# Patient Record
Sex: MALE | Race: WHITE | Employment: FULL TIME | URBAN - METROPOLITAN AREA
[De-identification: names, ages, dates, MRNs, and addresses within clinical notes are randomized per-mention and may not be internally consistent; named-entity substitution may affect disease eponyms.]

---

## 2020-01-18 ENCOUNTER — HOSPITAL ENCOUNTER (EMERGENCY)
Age: 20
Discharge: HOME | End: 2020-01-18
Attending: EMERGENCY MEDICINE
Payer: COMMERCIAL

## 2020-01-18 ENCOUNTER — APPOINTMENT (OUTPATIENT)
Dept: GENERAL RADIOLOGY | Age: 20
End: 2020-01-18
Payer: COMMERCIAL

## 2020-01-18 ENCOUNTER — APPOINTMENT (OUTPATIENT)
Dept: CT IMAGING | Age: 20
End: 2020-01-18
Payer: COMMERCIAL

## 2020-01-18 VITALS
HEART RATE: 88 BPM | SYSTOLIC BLOOD PRESSURE: 128 MMHG | DIASTOLIC BLOOD PRESSURE: 77 MMHG | TEMPERATURE: 98.7 F | WEIGHT: 200 LBS | RESPIRATION RATE: 18 BRPM | OXYGEN SATURATION: 99 %

## 2020-01-18 PROCEDURE — 96372 THER/PROPH/DIAG INJ SC/IM: CPT

## 2020-01-18 PROCEDURE — 71101 X-RAY EXAM UNILAT RIBS/CHEST: CPT

## 2020-01-18 PROCEDURE — 2580000003 HC RX 258: Performed by: EMERGENCY MEDICINE

## 2020-01-18 PROCEDURE — 6360000002 HC RX W HCPCS: Performed by: EMERGENCY MEDICINE

## 2020-01-18 PROCEDURE — 99284 EMERGENCY DEPT VISIT MOD MDM: CPT

## 2020-01-18 PROCEDURE — 6360000004 HC RX CONTRAST MEDICATION: Performed by: RADIOLOGY

## 2020-01-18 PROCEDURE — 74177 CT ABD & PELVIS W/CONTRAST: CPT

## 2020-01-18 RX ORDER — IBUPROFEN 800 MG/1
800 TABLET ORAL EVERY 8 HOURS PRN
Qty: 15 TABLET | Refills: 0 | Status: SHIPPED | OUTPATIENT
Start: 2020-01-18 | End: 2022-06-14

## 2020-01-18 RX ORDER — ALBUTEROL SULFATE 90 UG/1
2 AEROSOL, METERED RESPIRATORY (INHALATION) EVERY 6 HOURS PRN
Qty: 1 INHALER | Refills: 1 | Status: SHIPPED | OUTPATIENT
Start: 2020-01-18

## 2020-01-18 RX ORDER — BENZONATATE 100 MG/1
200 CAPSULE ORAL 3 TIMES DAILY PRN
Qty: 21 CAPSULE | Refills: 0 | Status: SHIPPED | OUTPATIENT
Start: 2020-01-18 | End: 2020-01-25

## 2020-01-18 RX ORDER — KETOROLAC TROMETHAMINE 30 MG/ML
30 INJECTION, SOLUTION INTRAMUSCULAR; INTRAVENOUS ONCE
Status: COMPLETED | OUTPATIENT
Start: 2020-01-18 | End: 2020-01-18

## 2020-01-18 RX ORDER — ARIPIPRAZOLE 10 MG/1
TABLET ORAL DAILY
COMMUNITY
End: 2022-09-03

## 2020-01-18 RX ORDER — 0.9 % SODIUM CHLORIDE 0.9 %
1000 INTRAVENOUS SOLUTION INTRAVENOUS ONCE
Status: COMPLETED | OUTPATIENT
Start: 2020-01-18 | End: 2020-01-18

## 2020-01-18 RX ADMIN — KETOROLAC TROMETHAMINE 30 MG: 30 INJECTION, SOLUTION INTRAMUSCULAR; INTRAVENOUS at 03:42

## 2020-01-18 RX ADMIN — SODIUM CHLORIDE 1000 ML: 9 INJECTION, SOLUTION INTRAVENOUS at 04:17

## 2020-01-18 RX ADMIN — IOPAMIDOL 100 ML: 755 INJECTION, SOLUTION INTRAVENOUS at 04:07

## 2020-01-18 ASSESSMENT — PAIN SCALES - GENERAL: PAINLEVEL_OUTOF10: 7

## 2020-01-18 NOTE — ED PROVIDER NOTES
pre-testing outweigh the risks or are not indicated as specified below and therefore the following is/are being waived:    [] Pregnancy test   [] Patients LMP on-time and regular.   [] Patient had Tubal Ligation or has other Contraception Device. [] Patient  is Menopausal or Premenarcheal.    [] Patient had Full or Partial Hysterectomy. [] Protocol for Iodine allergy    [] MRI Questionnaire     [x] BUN/Creatinine   [x] Patient age w/no hx of renal dysfunction. [] Patient on Dialysis. [] Recent Normal Labs. Electronically signed by Montse Rodriguez MD on 1/18/20 at 3:49 AM            --------------------------------- IMPRESSION AND DISPOSITION ---------------------------------    IMPRESSION  1. Rib contusion, left, initial encounter        DISPOSITION  Disposition: Discharge to home  Patient condition is stable      NOTE: This report was transcribed using voice recognition software.  Every effort was made to ensure accuracy; however, inadvertent computerized transcription errors may be present        Delmy Lindo MD  01/18/20 7145 Robert Green MD  01/25/20 [id number]

## 2021-02-10 ENCOUNTER — OFFICE VISIT (OUTPATIENT)
Dept: PRIMARY CARE CLINIC | Age: 21
End: 2021-02-10
Payer: COMMERCIAL

## 2021-02-10 VITALS
SYSTOLIC BLOOD PRESSURE: 121 MMHG | OXYGEN SATURATION: 97 % | WEIGHT: 180 LBS | HEIGHT: 69 IN | DIASTOLIC BLOOD PRESSURE: 79 MMHG | TEMPERATURE: 98.5 F | HEART RATE: 105 BPM | BODY MASS INDEX: 26.66 KG/M2

## 2021-02-10 DIAGNOSIS — J02.9 SORE THROAT: ICD-10-CM

## 2021-02-10 DIAGNOSIS — Z20.822 SUSPECTED COVID-19 VIRUS INFECTION: Primary | ICD-10-CM

## 2021-02-10 DIAGNOSIS — U07.1 COVID-19 VIRUS INFECTION: ICD-10-CM

## 2021-02-10 LAB
Lab: ABNORMAL
QC PASS/FAIL: ABNORMAL
S PYO AG THROAT QL: NORMAL
SARS-COV-2, POC: DETECTED

## 2021-02-10 PROCEDURE — 87426 SARSCOV CORONAVIRUS AG IA: CPT | Performed by: NURSE PRACTITIONER

## 2021-02-10 PROCEDURE — 99213 OFFICE O/P EST LOW 20 MIN: CPT | Performed by: NURSE PRACTITIONER

## 2021-02-10 PROCEDURE — 87880 STREP A ASSAY W/OPTIC: CPT | Performed by: NURSE PRACTITIONER

## 2021-02-10 RX ORDER — FLUOXETINE 10 MG/1
CAPSULE ORAL
COMMUNITY
Start: 2021-01-18

## 2021-02-10 NOTE — PROGRESS NOTES
Chief Complaint   Headache (Started two days ago), Cough, Pharyngitis, and Generalized Body Aches      History of Present Illness   Source of history provided by:  patientBhavna Nowak is a [address] y.o. old male who presents to the flu clinic with complaints of Fever, Chills, Generalized Body Aches, Prod Cough and Fatigue x 2 days. States symptoms have stayed the same since onset. Has been taking none without symptomatic relief. Denies any Shortness of breath. Denies any hx of no history of pneumonia or bronchitis. ROS   Pertinent positives and negatives are stated within HPI, all other systems reviewed and are negative. Past Medical History:  has a past medical history of Asthma. Past Surgical History:  has no past surgical history on file. Social History:  reports that he has never smoked. He does not have any smokeless tobacco history on file. He reports that he does not drink alcohol. Family History: family history is not on file. Allergies: Patient has no known allergies. Physical Exam   Vital Signs:  /79   Pulse 105   Temp 98.5 °F (36.9 °C)   Ht 5' 9\" (1.753 m)   Wt 180 lb (81.6 kg)   SpO2 97%   BMI 26.58 kg/m²    Oxygen Saturation Interpretation: Normal.    Constitutional:  Alert, development consistent with age. NAD. Head:  NC/NT. Airway patent. Ears: TMs Clear bilaterally. Canals without exudate or swelling bilaterally. Mouth: Posterior pharynx with mild erythema and clear postnasal drip. No tonsillar hypertrophy or exudate. Neck:  Normal ROM. Supple. no anterior cervical adenopathy noted. Lungs: CTAB without wheezes, rales, or rhonchi. CV:  Regular rate and rhythm, normal heart sounds, without pathological murmurs, ectopy, gallops, or rubs. Skin:  Normal turgor. Warm, dry, without visible rash. Lymphatic: No lymphangitis or adenopathy noted. Neurological:  Oriented. Motor functions intact.     Lab / Imaging Results (All laboratory and radiology results have been personally reviewed by myself)  Labs:  Results for orders placed or performed in visit on 02/10/21   POCT COVID-19, Antigen   Result Value Ref Range    SARS-COV-2, POC Detected (A) Not Detected    Lot Number [id number]     QC Pass/Fail pass        Imaging: All Radiology results interpreted by Radiologist unless otherwise noted. No results found. Medical Decision Making   Pt non-toxic, in no apparent distress and stable at time of discharge. Assessment/Plan   Kris Bray was seen today for headache, cough, pharyngitis and generalized body aches. Diagnoses and all orders for this visit:    Suspected COVID-19 virus infection  -     POCT COVID-19, Antigen    Sore throat  -     POCT rapid strep A        COVID-19 swab obtained and pending, will call with results once available. Advised self-quarantine at home in the interim. Work excuse provided to patient today. Increase fluids and rest. Symptomatic relief discussed including Tylenol prn pain/fever. Schedule f/u with PCP in 7-10 days if symptoms persist. ED sooner if symptoms worsen or change. ED immediately with high or refractory fever, progressive SOB, dyspnea, CP, calf pain/swelling, shaking chills, vomiting, abdominal pain, lethargy, flank pain, or decreased urinary output. Pt verbalizes understanding and is in agreement with plan of care. All questions answered. HERNESTO Benjamin - CNP    This visit was provided as a focused evaluation during the COVID -19 pandemic/national emergency. A comprehensive review of all previous patient history and testing was not conducted. Pertinent findings were elicited during the visit. *NOTE: This report was transcribed using voice recognition software. Every effort was made to ensure accuracy; however, inadvertent computerized transcription errors may be present.

## 2021-02-10 NOTE — PATIENT INSTRUCTIONS
Patient Education        Learning About Coronavirus [phone]  What is coronavirus (COVID-19)? COVID-19 is a disease caused by a new type of coronavirus. This illness was first found in December 2019. It has since spread worldwide. Coronaviruses are a large group of viruses. They cause the common cold. They also cause more serious illnesses like Middle East respiratory syndrome (MERS) and severe acute respiratory syndrome (SARS). COVID-19 is caused by a novel coronavirus. That means it's a new type that has not been seen in people before. What are the symptoms? Coronavirus (COVID-19) symptoms may include:  · Fever. · Cough. · Trouble breathing. · Chills or repeated shaking with chills. · Muscle pain. · Headache. · Sore throat. · New loss of taste or smell. · Vomiting. · Diarrhea. In severe cases, COVID-19 can cause pneumonia and make it hard to breathe without help from a machine. It can cause death. How is it diagnosed? COVID-19 is diagnosed with a viral test. This may also be called a PCR test or antigen test. It looks for evidence of the virus in your breathing passages or lungs (respiratory system). The test is most often done on a sample from the nose, throat, or lungs. It's sometimes done on a sample of saliva. One way a sample is collected is by putting a long swab into the back of your nose. How is it treated? Mild cases of COVID-19 can be treated at home. Serious cases need treatment in the hospital. Treatment may include medicines to reduce symptoms, plus breathing support such as oxygen therapy or a ventilator. Some people may be placed on their belly to help their oxygen levels. Treatments that may help people who have COVID-19 include:  Antiviral medicines. These medicines treat viral infections. Remdesivir is an example. Immune-based therapy. These medicines help the immune system fight COVID-19. One example is bamlanivimab. It's a monoclonal antibody. Blood thinners. These medicines help prevent blood clots. People with severe illness are at risk for blood clots. How can you protect yourself and others? The best way to protect yourself from getting sick is to:  · Avoid areas where there is an outbreak. · Avoid contact with people who may be infected. · Avoid crowds and try to stay at least 6 feet away from other people. · Wash your hands often, especially after you cough or sneeze. Use soap and water, and scrub for at least 20 seconds. If soap and water aren't available, use an alcohol-based hand . · Avoid touching your mouth, nose, and eyes. To help avoid spreading the virus to others:  · Stay home if you are sick or have been exposed to the virus. Don't go to school, work, or public areas. And don't use public transportation, ride-shares, or taxis unless you have no choice. · Wear a cloth face cover if you have to go to public areas. · Cover your mouth with a tissue when you cough or sneeze. Then throw the tissue in the trash and wash your hands right away. · If you're sick:  ? Leave your home only if you need to get medical care. But call the doctor's office first so they know you're coming. And wear a face cover. ? Wear the face cover whenever you're around other people. It can help stop the spread of the virus when you cough or sneeze. ? Limit contact with pets and people in your home. If possible, stay in a separate bedroom and use a separate bathroom. ? Clean and disinfect your home every day. Use household  and disinfectant wipes or sprays. Take special care to clean things that you grab with your hands. These include doorknobs, remote controls, phones, and handles on your refrigerator and microwave. And don't forget countertops, tabletops, bathrooms, and computer keyboards. When should you call for help? Call 911 anytime you think you may need emergency care. For example, call if you have life-threatening symptoms, such as:    · You have severe trouble breathing. (You can't talk at all.)     · You have constant chest pain or pressure.     · You are severely dizzy or lightheaded.     · You are confused or can't think clearly.     · Your face and lips have a blue color.     · You pass out (lose consciousness) or are very hard to wake up. Call your doctor now or seek immediate medical care if:    · You have moderate trouble breathing. (You can't speak a full sentence.)     · You are coughing up blood (more than about 1 teaspoon).     · You have signs of low blood pressure. These include feeling lightheaded; being too weak to stand; and having cold, pale, clammy skin. Watch closely for changes in your health, and be sure to contact your doctor if:    · Your symptoms get worse.     · You are not getting better as expected. Call before you go to the doctor's office. Follow their instructions. And wear a cloth face cover. Current as of: December 18, 2020               Content Version: 12.7  © 3932-8251 Healthwise, [employer]. Care instructions adapted under license by [facility] ([facility]). If you have questions about a medical condition or this instruction, always ask your healthcare professional. [place] [address] any warranty or liability for your use of this information. Patient Education        Coronavirus (EEDPX-13): Care Instructions  Overview  The coronavirus disease (COVID-19) is caused by a virus. Symptoms may include a fever, a cough, and shortness of breath. It mainly spreads person-to-person through droplets from coughing and sneezing. The virus also can spread when people are in close contact with someone who is infected. Most people have mild symptoms and can take care of themselves at home. If their symptoms get worse, they may need care in a hospital. Treatment may include medicines to reduce symptoms, plus breathing support such as oxygen therapy or a ventilator. It's important to not spread the virus to others. If you have COVID-19, wear a face cover anytime you are around other people. You need to isolate yourself while you are sick. Leave your home only if you need to get medical care or testing. Follow-up care is a key part of your treatment and safety. Be sure to make and go to all appointments, and call your doctor if you are having problems. It's also a good idea to know your test results and keep a list of the medicines you take. How can you care for yourself at home? · Get extra rest. It can help you feel better. · Drink plenty of fluids. This helps replace fluids lost from fever. Fluids also help ease a scratchy throat. Water, soup, fruit juice, and hot tea with lemon are good choices. · Take acetaminophen (such as Tylenol) to reduce a fever. It may also help with muscle aches. Read and follow all instructions on the label. · Use petroleum jelly on sore skin. This can help if the skin around your nose and lips becomes sore from rubbing a lot with tissues. Tips for self-isolation  · Limit contact with people in your home. If possible, stay in a separate bedroom and use a separate bathroom. · Wear a cloth face cover when you are around other people. It can help stop the spread of the virus when you cough or sneeze. · If you have to leave home, avoid crowds and try to stay at least 6 feet away from other people. · Avoid contact with pets and other animals. · Cover your mouth and nose with a tissue when you cough or sneeze. Then throw it in the trash right away. · Wash your hands often, especially after you cough or sneeze. Use soap and water, and scrub for at least 20 seconds. If soap and water aren't available, use an alcohol-based hand . · Don't share personal household items. These include bedding, towels, cups and glasses, and eating utensils. · [address] in the warmest water allowed for the fabric type, and dry it completely. It's okay to wash other people's laundry with yours. · Clean and disinfect your home every day. Use household  and disinfectant wipes or sprays. Take special care to clean things that you grab with your hands. These include doorknobs, remote controls, phones, and handles on your refrigerator and microwave. And don't forget countertops, tabletops, bathrooms, and computer keyboards. When you can end self-isolation  · If you know or suspect that you have COVID-19, stay in self-isolation until:  ? You haven't had a fever for 24 hours while not taking medicines to lower the fever, and  ? Your symptoms have improved, and  ? It's been at least 10 days since your symptoms started. · Talk to your doctor about whether you also need testing, especially if you have a weakened immune system. When should you call for help? Call 911 anytime you think you may need emergency care. For example, call if you have life-threatening symptoms, such as:    · You have severe trouble breathing. (You can't talk at all.)     · You have constant chest pain or pressure.     · You are severely dizzy or lightheaded.     · You are confused or can't think clearly.     · Your face and lips have a blue color.     · You pass out (lose consciousness) or are very hard to wake up. Call your doctor now or seek immediate medical care if:    · You have moderate trouble breathing. (You can't speak a full sentence.)     · You are coughing up blood (more than about 1 teaspoon).   · You have signs of low blood pressure. These include feeling lightheaded; being too weak to stand; and having cold, pale, clammy skin. Watch closely for changes in your health, and be sure to contact your doctor if:    · Your symptoms get worse.     · You are not getting better as expected. Call before you go to the doctor's office. Follow their instructions. And wear a cloth face cover. Current as of: December 18, 2020               Content Version: 12.7  © 2006-2021 Healthwise, [facility]. Care instructions adapted under license by [facility] ([facility]). If you have questions about a medical condition or this instruction, always ask your healthcare professional. [place] [address] any warranty or liability for your use of this information. Patient Education        COVID-19 Viral Test: About This Test  What is it? A COVID-19 viral test is a way to find out if you have COVID-19. The test looks for the virus in your breathing passages. There are different types of viral tests. One type looks for genetic material from the virus. This is usually called polymerase chain reaction (PCR). Another type looks for proteins on the virus. This is usually called an antigen test. It may not be as accurate as PCR. Some test results come back in a few minutes. Others may take a few days. Why is it done? This test is used to diagnose a current infection with SARS-CoV-2, the virus that causes COVID-19. Knowing that you have the virus means that you can take steps to protect others from getting infected. This can help limit the spread of the virus. Knowing who has COVID-19 is also important for experts who track the virus. It can help them learn more about how the virus spreads. How do you prepare for the test?  You don't need to do anything to prepare for this test. But be sure to follow any instructions your health care provider gives you. How is it done? The test is most often done on a sample from your nose or throat. It's sometimes done on a sample of saliva. One way a sample is collected is by putting a long swab into the back of your nose. Samples can be tested in different ways to look for an infection. What should you do while you wait for your test results? While you wait for the results of your COVID-19 test, stay in the place where you live, and stay away from others. Do this even if you don't feel sick or have any symptoms. Don't leave unless you need medical care. If you can, try to stay in a separate room. This might help you avoid infecting family members or other people you live with. Follow your doctor's instructions about what to do when you get your results back. Be sure to wear a mask and follow social-distancing guidelines after you get your results, even if the test is negative. What do your results mean? The result is either positive or negative. A positive result means that the antigen or the genetic material of the virus was found in your sample. You have COVID-19 now. A negative result means that the antigen or the genetic material was not found. This may mean that you don't have COVID-19. But it's possible to get a \"false-negative\" result. This means that the test shows that you don't have COVID-19 when in fact you do. This may happen because you were tested too soon after you were infected, before the virus started to spread in your nose and throat. Or it could happen because the swab missed the infection. If you get a negative result for an antigen test, your doctor may recommend that you get another test, such as polymerase chain reaction (PCR), to make sure you don't have the virus. In general, PCR is more accurate than an antigen test.  Some test results come back in a few minutes. Others may take a few days. If your test is negative, follow your doctor's advice for when you can go back to activities. If your test is positive, talk to your doctor or a public health official about what you need to do. Where can you learn more? Go to [email or web address] and sign in to your [employer] account. Enter A129 in the [employer] box to learn more about \"COVID-19 Viral Test: About This Test.\"     If you do not have an account, please click on the \"Sign Up Now\" link. Current as of: December 18, 2020               Content Version: 12.7  © 8356-6419 Healthwise, [employer]. Care instructions adapted under license by [facility] ([facility]). If you have questions about a medical condition or this instruction, always ask your healthcare professional. [address] any warranty or liability for your use of this information.

## 2021-02-10 NOTE — LETTER
NOTIFICATION RETURN TO WORK / SCHOOL    2/10/2021    Mr. Katie Bassett  [address] [place] [id number]      To Whom It May Concern:    Katie Bassett was tested for COVID-19 on 2/10, and the result was Postive. He may return to work on 2/18. I recommend:return without restrictions    If there are questions or concerns, please have the patient contact our office.         Sincerely,          Sarah Moreland, HERNESTO - CNP

## 2021-10-11 ENCOUNTER — OFFICE VISIT (OUTPATIENT)
Dept: PRIMARY CARE CLINIC | Age: 21
End: 2021-10-11
Payer: COMMERCIAL

## 2021-10-11 VITALS
DIASTOLIC BLOOD PRESSURE: 82 MMHG | WEIGHT: 180 LBS | SYSTOLIC BLOOD PRESSURE: 128 MMHG | OXYGEN SATURATION: 98 % | BODY MASS INDEX: 26.66 KG/M2 | TEMPERATURE: 98.4 F | HEART RATE: 84 BPM | HEIGHT: 69 IN

## 2021-10-11 DIAGNOSIS — Z20.822 EXPOSURE TO COVID-19 VIRUS: Primary | ICD-10-CM

## 2021-10-11 LAB
Lab: ABNORMAL
PERFORMING INSTRUMENT: ABNORMAL
QC PASS/FAIL: ABNORMAL
SARS-COV-2, POC: DETECTED

## 2021-10-11 PROCEDURE — 99212 OFFICE O/P EST SF 10 MIN: CPT | Performed by: STUDENT IN AN ORGANIZED HEALTH CARE EDUCATION/TRAINING PROGRAM

## 2021-10-11 PROCEDURE — 87426 SARSCOV CORONAVIRUS AG IA: CPT | Performed by: STUDENT IN AN ORGANIZED HEALTH CARE EDUCATION/TRAINING PROGRAM

## 2021-10-11 NOTE — PROGRESS NOTES
Chief Complaint       Generalized Body Aches (x 1 wk), Pharyngitis, and Concern For COVID-19 (exposed )      History of Present Illness   Source of history provided by:  patient. Lore Borja is a 24 y.o. old male presenting to the walk in clinic for evaluation of COVID symptoms. Girlfriend tested positive for COVID last week. He lost taste and smell. Reports body aches, non-productive cough. Diarrhea. Denies any, CP, dyspnea, LE edema, abdominal pain, vomiting, rash, or lethargy. ROS    Unless otherwise stated in this report or unable to obtain because of the patient's clinical or mental status as evidenced by the medical record, this patients's positive and negative responses for Review of Systems, constitutional, psych, eyes, ENT, cardiovascular, respiratory, gastrointestinal, neurological, genitourinary, musculoskeletal, integument systems and systems related to the presenting problem are either stated in the preceding or were not pertinent or were negative for the symptoms and/or complaints related to the medical problem. Past Medical History:  has a past medical history of Asthma. Past Surgical History:  has no past surgical history on file. Social History:  reports that he has never smoked. He has never used smokeless tobacco. He reports that he does not drink alcohol. Family History: family history is not on file. Allergies: Patient has no known allergies. Physical Exam         VS:  /82   Pulse 84   Temp 98.4 °F (36.9 °C)   Ht 5' 9\" (1.753 m)   Wt 180 lb (81.6 kg)   SpO2 98%   BMI 26.58 kg/m²    Oxygen Saturation Interpretation: Normal.    Constitutional:  Alert, development consistent with age. NAD. Head:  NC/NT. Airway patent. Mouth: Posterior pharynx with mild erythema and clear postnasal drip. No tonsillar hypertrophy or exudate. Neck:  Normal ROM. Supple. No anterior cervical adenopathy noted. Lungs: CTAB without wheezes, rales, or rhonchi.    CV:  Regular rate and rhythm, normal heart sounds, without pathological murmurs, ectopy, gallops, or rubs. Skin:  Normal turgor. Warm, dry, without visible rash. Lymphatic: No lymphangitis or adenopathy noted. Neurological:  Oriented. Motor functions intact. Lab / Imaging Results   (All laboratory and radiology results have been personally reviewed by myself)  Labs:  Results for orders placed or performed in visit on 10/11/21   POCT COVID-19, Antigen   Result Value Ref Range    SARS-COV-2, POC Detected (A) Not Detected    Lot Number [id number]     QC Pass/Fail pass     Performing Instrument BD Veritor        Imaging: All Radiology results interpreted by Radiologist unless otherwise noted. Assessment / Plan     Impression(s):  Cherylene Leas was seen today for generalized body aches, pharyngitis and concern for covid-19. Diagnoses and all orders for this visit:    Exposure to COVID-19 virus  -     POCT COVID-19, Antigen      Disposition:  Disposition: Discharge to home. Rapid COVID-19 testing is positive in office. Advised patient to self quarantine for 14 days from onset of symptoms. Pt should also be fever free for 24 hours and symptoms should be improved overall prior to returning. Increase fluids and rest. Symptomatic relief discussed including Tylenol prn pain/fever. Schedule virtual f/u with PCP in 7-10 days if symptoms persist. ED sooner if symptoms worsen or change. ED immediately with high or refractory fever, progressive SOB, dyspnea, CP, calf pain/swelling, shaking chills, vomiting, abdominal pain, lethargy, flank pain, or decreased urinary output. Pt verbalizes understanding and is in agreement with plan of care. All questions answered.     Ruddy Powell MD

## 2021-10-11 NOTE — LETTER
[address] [facility] [place]  [address] [place] [address]  Phone: [phone]  Fax: [phone]    Nancy Stock MD        October 11, 2021     Patient: Marc Xavier   YOB: 2000   Date of Visit: 10/11/2021       To Whom It May Concern: It is my medical opinion that Stephanie Sun should be excused from work and school until 10/20/2021. Patient tested positive for CVOID 19 on 10/11/2021    If you have any questions or concerns, please don't hesitate to call.     Sincerely,        Nancy Stock MD

## 2021-10-11 NOTE — Clinical Note
[address] [place] [address] [place]  [address] [place] [address]  Phone: [phone]  Fax: [phone]    Trudy White MD        October 11, 2021     Patient: Kesha Griffin   YOB: 2000   Date of Visit: 10/11/2021       To Whom it May Concern:    Ld Perales was seen in my clinic on 10/11/2021. He {Return to school/sport/work:43933}. If you have any questions or concerns, please don't hesitate to call.     Sincerely,         Trudy White MD

## 2022-05-01 ENCOUNTER — HOSPITAL ENCOUNTER (EMERGENCY)
Age: 22
Discharge: HOME | End: 2022-05-01
Payer: COMMERCIAL

## 2022-05-01 VITALS
SYSTOLIC BLOOD PRESSURE: 136 MMHG | RESPIRATION RATE: 14 BRPM | TEMPERATURE: 97.7 F | BODY MASS INDEX: 25.77 KG/M2 | WEIGHT: 180 LBS | HEIGHT: 70 IN | DIASTOLIC BLOOD PRESSURE: 83 MMHG | HEART RATE: 100 BPM | OXYGEN SATURATION: 99 %

## 2022-05-01 DIAGNOSIS — S61.011A LACERATION OF RIGHT THUMB WITHOUT FOREIGN BODY WITHOUT DAMAGE TO NAIL, INITIAL ENCOUNTER: Primary | ICD-10-CM

## 2022-05-01 PROCEDURE — 99283 EMERGENCY DEPT VISIT LOW MDM: CPT

## 2022-05-01 PROCEDURE — 2500000003 HC RX 250 WO HCPCS: Performed by: NURSE PRACTITIONER

## 2022-05-01 PROCEDURE — 6370000000 HC RX 637 (ALT 250 FOR IP): Performed by: NURSE PRACTITIONER

## 2022-05-01 PROCEDURE — 12001 RPR S/N/AX/GEN/TRNK 2.5CM/<: CPT

## 2022-05-01 RX ORDER — CEFUROXIME AXETIL 500 MG/1
500 TABLET ORAL 2 TIMES DAILY
Qty: 14 TABLET | Refills: 0 | Status: SHIPPED | OUTPATIENT
Start: 2022-05-01 | End: 2022-05-08

## 2022-05-01 RX ORDER — LIDOCAINE HYDROCHLORIDE 10 MG/ML
5 INJECTION, SOLUTION INFILTRATION; PERINEURAL ONCE
Status: COMPLETED | OUTPATIENT
Start: 2022-05-01 | End: 2022-05-01

## 2022-05-01 RX ORDER — ACETAMINOPHEN 500 MG
1000 TABLET ORAL ONCE
Status: COMPLETED | OUTPATIENT
Start: 2022-05-01 | End: 2022-05-01

## 2022-05-01 RX ORDER — DIAPER,BRIEF,INFANT-TODD,DISP
EACH MISCELLANEOUS ONCE
Status: COMPLETED | OUTPATIENT
Start: 2022-05-01 | End: 2022-05-01

## 2022-05-01 RX ADMIN — ACETAMINOPHEN 1000 MG: 500 TABLET ORAL at 19:44

## 2022-05-01 RX ADMIN — LIDOCAINE HYDROCHLORIDE 5 ML: 10 INJECTION, SOLUTION INFILTRATION; PERINEURAL at 19:45

## 2022-05-01 RX ADMIN — BACITRACIN ZINC: 500 OINTMENT TOPICAL at 19:44

## 2022-05-01 ASSESSMENT — PAIN - FUNCTIONAL ASSESSMENT
PAIN_FUNCTIONAL_ASSESSMENT: 0-10
PAIN_FUNCTIONAL_ASSESSMENT: NONE - DENIES PAIN

## 2022-05-01 ASSESSMENT — PAIN DESCRIPTION - PAIN TYPE: TYPE: ACUTE PAIN

## 2022-05-01 ASSESSMENT — PAIN DESCRIPTION - ORIENTATION: ORIENTATION: RIGHT

## 2022-05-01 ASSESSMENT — PAIN DESCRIPTION - ONSET: ONSET: ON-GOING

## 2022-05-01 ASSESSMENT — PAIN SCALES - GENERAL
PAINLEVEL_OUTOF10: 2
PAINLEVEL_OUTOF10: 0

## 2022-05-01 ASSESSMENT — PAIN DESCRIPTION - FREQUENCY: FREQUENCY: CONTINUOUS

## 2022-05-01 ASSESSMENT — PAIN DESCRIPTION - LOCATION: LOCATION: FINGER (COMMENT WHICH ONE)

## 2022-05-01 ASSESSMENT — PAIN DESCRIPTION - DESCRIPTORS: DESCRIPTORS: ACHING

## 2022-05-01 NOTE — Clinical Note
Dipika Sood was seen and treated in our emergency department on 5/1/2022. He may return to work on 05/03/2022. If you have any questions or concerns, please don't hesitate to call.       Vida Perdomo, APRN - CNP

## 2022-05-01 NOTE — ED PROVIDER NOTES
[address]  Department of Emergency Medicine   ED  Encounter Note  Admit Date/RoomTime: 2022  6:19 PM  ED Room: [facility]    NAME: Melissa Ford  : 2000  MRN: [id number]     Chief Complaint:  Hand Pain (right thumb laceration after cutting with a knife)    History of Present Illness       Melissa Ford is a 24 y.o. old male who presents to the emergency department for evaluation of right thumb laceration that occurred just prior to arrival.  Patient states it was a clean pocket knife that he lacerated while trying to cut a zip tie. He denies any limited range of motion of his thumb secondary to his injury. He was able to control the bleeding with a pressure bandage. He denies being on any blood thinners and reports his tetanus vaccination to be up-to-date within the last 5 years. He has not had any over-the-counter intervention for his discomfort prior to arrival.  His symptoms are moderate in severity and persistent nature. ROS   Pertinent positives and negatives are stated within HPI, all other systems reviewed and are negative. Past Medical History:  has a past medical history of Asthma. Surgical History:  has no past surgical history on file. Social History:  reports that he has never smoked. He has never used smokeless tobacco. He reports that he does not drink alcohol. Family History: family history is not on file. Allergies: Patient has no known allergies. Physical Exam   Oxygen Saturation Interpretation: Normal.        ED Triage Vitals   BP Temp Temp Source Pulse Resp SpO2 Height Weight   22 1816 22 1816 22 1816 22 18122 18122 18122 1822 22 182   136/83 97.7 °F (36.5 °C) Temporal 100 14 99 % 5' 10\" (1.778 m) 180 lb (81.6 kg)         · Constitutional:  Alert, development consistent with age. · HEENT:  NC/NT. No outward sign of trauma. Airway patent.   · Neck:  Normal ROM. Supple. · Respiratory: No respiratory distress or increased work of breathing. · Cardiovascular: Regular rate and rhythm. Strong distal pulses. · Right Upper Extremity(s): thumb. Tenderness:  Mild soft tissue tenderness localized to centimeter laceration on the ulnar aspect of the distal phalanx of the thumb. There is no foreign body or tendon injury. Bleeding is controlled. No bony tenderness through the thumb, other fingers or hand. Strong radial and ulnar pulse. Full sensation. Swelling: None. Deformity: no deformity observed/palpated. ROM: full range with pain and strong against resistance with flexion, extension, abduction and abduction              Skin: 2 cm laceration as described above. No ecchymosis, erythema or warmth. .             Neurovascular: Motor deficit: none. Sensory deficit: none. Pulse deficit: none. Capillary refill: normal.  · Neurological:  Alert and oriented. Motor and sensory functions intact unless otherwise described above. Lab / Imaging Results   (All laboratory and radiology results have been personally reviewed by myself)  Labs:  No results found for this visit on 05/01/22. Imaging: All Radiology results interpreted by Radiologist unless otherwise noted. No orders to display     ED Course / Medical Decision Making     Medications   lidocaine 1 % injection 5 mL (5 mLs IntraDERmal Given by Other 5/1/22 1945)   bacitracin zinc ointment ( Topical Given 5/1/22 1944)   acetaminophen (TYLENOL) tablet 1,000 mg (1,000 mg Oral Given 5/1/22 1944)          Re-examination:  5/1/22     Time: 2000  Patients symptoms are improving. Repeat physical examination has improved. Discussed results and treatment plan.     Consult:   None    Procedure(s):  PROCEDURE NOTE  5/1/22       Time: 1945    LACERATION REPAIR  Risks, benefits and alternatives (for applicable procedures below) described. Performed By: HERNESTO Bird CNP. Informed consent: Verbal consent obtained. The patient was counseled regarding the procedure in person, it's indications, risks, potential complications and alternatives and any questions were answered. Verbal consent was obtained. Laceration #: 1. Location: right thumb  Length: 2.0 cm. The wound area was irrigated with sterile saline, cleansed with chlorhexidine gluconate and draped in a sterile fashion. Local Anesthesia:  obtained with Lidocaine 1% without epinephrine. The wound was explored with the following results: Thickness: full thickness. no foreign body or tendon injury seen. Debridement: None. Undermining: None. Wound Margins Revised: None. Flaps Aligned: yes. The wound was closed in single layer closure with #5  4-0 Ethilon using interrupted suture(s). Dressing:  bacitracin, a sterile dressing and metal finger splint. There were no additional wounds requiring formal closure. MDM:    Neurovascularly intact. TDAP UTD. Wound repaired. Wound care discussed. Plan is for BID wound care, topical ABX, OTC tylenol/motrin as needed and watchful waiting for starting oral ABX for S/S of wound infection which patient was given a printed prescription for ceftin. Otherwise outpatient follow up with the PCP/clinic for a wound recheck as needed and suture removal in 7-10 days. Patient is aware of S/S indicative of re-evaluation in the ER setting. Patient verbalizes understanding of instructions and departed in stable condition. Plan of Care/Counseling:  HERNESTO Bird CNP reviewed today's visit with the patient and friend of the patient in addition to providing specific details for the plan of care and counseling regarding the diagnosis and prognosis. Questions are answered at this time and are agreeable with the plan. Assessment      1.  Laceration of right thumb without foreign body without damage to nail, initial encounter      Plan   Discharged home. Patient condition is stable    New Medications     New Prescriptions    CEFUROXIME (CEFTIN) 500 MG TABLET    Take 1 tablet by mouth 2 times daily for 7 days     Electronically signed by HERNESTO Holly CNP   DD: 5/1/22  **This report was transcribed using voice recognition software. Every effort was made to ensure accuracy; however, inadvertent computerized transcription errors may be present.   END OF ED PROVIDER NOTE        HERNESTO Holly CNP  05/2000

## 2022-05-31 ENCOUNTER — HOSPITAL ENCOUNTER (OUTPATIENT)
Age: 22
Setting detail: OBSERVATION
Discharge: HOME | End: 2022-06-01
Attending: EMERGENCY MEDICINE | Admitting: SURGERY
Payer: COMMERCIAL

## 2022-05-31 ENCOUNTER — APPOINTMENT (OUTPATIENT)
Dept: CT IMAGING | Age: 22
End: 2022-05-31
Payer: COMMERCIAL

## 2022-05-31 DIAGNOSIS — K37 APPENDICITIS, UNSPECIFIED APPENDICITIS TYPE: ICD-10-CM

## 2022-05-31 DIAGNOSIS — K35.80 ACUTE APPENDICITIS, UNSPECIFIED ACUTE APPENDICITIS TYPE: Primary | ICD-10-CM

## 2022-05-31 DIAGNOSIS — G89.18 POST-OP PAIN: ICD-10-CM

## 2022-05-31 LAB
ALBUMIN SERPL-MCNC: 4.8 G/DL (ref 3.5–5.2)
ALP BLD-CCNC: 87 U/L (ref 40–129)
ALT SERPL-CCNC: 19 U/L (ref 0–40)
ANION GAP SERPL CALCULATED.3IONS-SCNC: 16 MMOL/L (ref 7–16)
AST SERPL-CCNC: 24 U/L (ref 0–39)
BASOPHILS ABSOLUTE: 0.01 E9/L (ref 0–0.2)
BASOPHILS RELATIVE PERCENT: 0.1 % (ref 0–2)
BILIRUB SERPL-MCNC: 0.9 MG/DL (ref 0–1.2)
BUN BLDV-MCNC: 13 MG/DL (ref 6–20)
CALCIUM SERPL-MCNC: 9.6 MG/DL (ref 8.6–10.2)
CHLORIDE BLD-SCNC: 101 MMOL/L (ref 98–107)
CO2: 21 MMOL/L (ref 22–29)
CREAT SERPL-MCNC: 1.1 MG/DL (ref 0.7–1.2)
EOSINOPHILS ABSOLUTE: 0.01 E9/L (ref 0.05–0.5)
EOSINOPHILS RELATIVE PERCENT: 0.1 % (ref 0–6)
GFR AFRICAN AMERICAN: >60
GFR NON-AFRICAN AMERICAN: >60 ML/MIN/1.73
GLUCOSE BLD-MCNC: 103 MG/DL (ref 74–99)
HCT VFR BLD CALC: 41.9 % (ref 37–54)
HEMOGLOBIN: 14.8 G/DL (ref 12.5–16.5)
IMMATURE GRANULOCYTES #: 0.04 E9/L
IMMATURE GRANULOCYTES %: 0.3 % (ref 0–5)
LACTIC ACID, SEPSIS: 1.1 MMOL/L (ref 0.5–1.9)
LIPASE: 12 U/L (ref 13–60)
LYMPHOCYTES ABSOLUTE: 0.51 E9/L (ref 1.5–4)
LYMPHOCYTES RELATIVE PERCENT: 3.7 % (ref 20–42)
MCH RBC QN AUTO: 29.4 PG (ref 26–35)
MCHC RBC AUTO-ENTMCNC: 35.3 % (ref 32–34.5)
MCV RBC AUTO: 83.3 FL (ref 80–99.9)
MONOCYTES ABSOLUTE: 1.02 E9/L (ref 0.1–0.95)
MONOCYTES RELATIVE PERCENT: 7.3 % (ref 2–12)
NEUTROPHILS ABSOLUTE: 12.33 E9/L (ref 1.8–7.3)
NEUTROPHILS RELATIVE PERCENT: 88.5 % (ref 43–80)
PDW BLD-RTO: 13.2 FL (ref 11.5–15)
PLATELET # BLD: 238 E9/L (ref 130–450)
PMV BLD AUTO: 9.7 FL (ref 7–12)
POTASSIUM REFLEX MAGNESIUM: 4 MMOL/L (ref 3.5–5)
RBC # BLD: 5.03 E12/L (ref 3.8–5.8)
SODIUM BLD-SCNC: 138 MMOL/L (ref 132–146)
TOTAL PROTEIN: 7.6 G/DL (ref 6.4–8.3)
WBC # BLD: 13.9 E9/L (ref 4.5–11.5)

## 2022-05-31 PROCEDURE — 83690 ASSAY OF LIPASE: CPT

## 2022-05-31 PROCEDURE — 85025 COMPLETE CBC W/AUTO DIFF WBC: CPT

## 2022-05-31 PROCEDURE — 6360000002 HC RX W HCPCS: Performed by: NURSE PRACTITIONER

## 2022-05-31 PROCEDURE — 96366 THER/PROPH/DIAG IV INF ADDON: CPT

## 2022-05-31 PROCEDURE — 88304 TISSUE EXAM BY PATHOLOGIST: CPT

## 2022-05-31 PROCEDURE — 96365 THER/PROPH/DIAG IV INF INIT: CPT

## 2022-05-31 PROCEDURE — 83605 ASSAY OF LACTIC ACID: CPT

## 2022-05-31 PROCEDURE — 99285 EMERGENCY DEPT VISIT HI MDM: CPT

## 2022-05-31 PROCEDURE — 80053 COMPREHEN METABOLIC PANEL: CPT

## 2022-05-31 PROCEDURE — 74177 CT ABD & PELVIS W/CONTRAST: CPT

## 2022-05-31 PROCEDURE — 2580000003 HC RX 258: Performed by: NURSE PRACTITIONER

## 2022-05-31 PROCEDURE — 6360000004 HC RX CONTRAST MEDICATION: Performed by: RADIOLOGY

## 2022-05-31 PROCEDURE — 36415 COLL VENOUS BLD VENIPUNCTURE: CPT

## 2022-05-31 PROCEDURE — 2500000003 HC RX 250 WO HCPCS: Performed by: NURSE PRACTITIONER

## 2022-05-31 PROCEDURE — 96376 TX/PRO/DX INJ SAME DRUG ADON: CPT

## 2022-05-31 PROCEDURE — 96375 TX/PRO/DX INJ NEW DRUG ADDON: CPT

## 2022-05-31 PROCEDURE — 2580000003 HC RX 258: Performed by: RADIOLOGY

## 2022-05-31 PROCEDURE — 6360000002 HC RX W HCPCS: Performed by: EMERGENCY MEDICINE

## 2022-05-31 RX ORDER — METRONIDAZOLE 500 MG/100ML
500 INJECTION, SOLUTION INTRAVENOUS ONCE
Status: COMPLETED | OUTPATIENT
Start: 2022-05-31 | End: 2022-06-01

## 2022-05-31 RX ORDER — 0.9 % SODIUM CHLORIDE 0.9 %
1000 INTRAVENOUS SOLUTION INTRAVENOUS ONCE
Status: COMPLETED | OUTPATIENT
Start: 2022-05-31 | End: 2022-05-31

## 2022-05-31 RX ORDER — SODIUM CHLORIDE 0.9 % (FLUSH) 0.9 %
10 SYRINGE (ML) INJECTION PRN
Status: COMPLETED | OUTPATIENT
Start: 2022-05-31 | End: 2022-05-31

## 2022-05-31 RX ORDER — FENTANYL CITRATE 50 UG/ML
75 INJECTION, SOLUTION INTRAMUSCULAR; INTRAVENOUS ONCE
Status: COMPLETED | OUTPATIENT
Start: 2022-05-31 | End: 2022-05-31

## 2022-05-31 RX ORDER — ONDANSETRON 2 MG/ML
4 INJECTION INTRAMUSCULAR; INTRAVENOUS ONCE
Status: COMPLETED | OUTPATIENT
Start: 2022-05-31 | End: 2022-05-31

## 2022-05-31 RX ORDER — MORPHINE SULFATE 4 MG/ML
4 INJECTION, SOLUTION INTRAMUSCULAR; INTRAVENOUS ONCE
Status: COMPLETED | OUTPATIENT
Start: 2022-05-31 | End: 2022-05-31

## 2022-05-31 RX ORDER — FENTANYL CITRATE 50 UG/ML
50 INJECTION, SOLUTION INTRAMUSCULAR; INTRAVENOUS ONCE
Status: DISCONTINUED | OUTPATIENT
Start: 2022-05-31 | End: 2022-05-31

## 2022-05-31 RX ADMIN — IOPAMIDOL 75 ML: 755 INJECTION, SOLUTION INTRAVENOUS at 21:30

## 2022-05-31 RX ADMIN — METRONIDAZOLE 500 MG: 500 INJECTION, SOLUTION INTRAVENOUS at 23:33

## 2022-05-31 RX ADMIN — ONDANSETRON 4 MG: 2 INJECTION INTRAMUSCULAR; INTRAVENOUS at 20:48

## 2022-05-31 RX ADMIN — CEFTRIAXONE 1000 MG: 1 INJECTION, POWDER, FOR SOLUTION INTRAMUSCULAR; INTRAVENOUS at 23:23

## 2022-05-31 RX ADMIN — SODIUM CHLORIDE, PRESERVATIVE FREE 10 ML: 5 INJECTION INTRAVENOUS at 21:30

## 2022-05-31 RX ADMIN — MORPHINE SULFATE 4 MG: 4 INJECTION, SOLUTION INTRAMUSCULAR; INTRAVENOUS at 20:41

## 2022-05-31 RX ADMIN — FENTANYL CITRATE 75 MCG: 50 INJECTION, SOLUTION INTRAMUSCULAR; INTRAVENOUS at 23:27

## 2022-05-31 RX ADMIN — SODIUM CHLORIDE 1000 ML: 9 INJECTION, SOLUTION INTRAVENOUS at 20:48

## 2022-05-31 RX ADMIN — MORPHINE SULFATE 4 MG: 4 INJECTION, SOLUTION INTRAMUSCULAR; INTRAVENOUS at 22:23

## 2022-05-31 ASSESSMENT — PAIN DESCRIPTION - LOCATION
LOCATION: ABDOMEN

## 2022-05-31 ASSESSMENT — PAIN SCALES - GENERAL
PAINLEVEL_OUTOF10: 10
PAINLEVEL_OUTOF10: 9
PAINLEVEL_OUTOF10: 8
PAINLEVEL_OUTOF10: 7
PAINLEVEL_OUTOF10: 7
PAINLEVEL_OUTOF10: 9

## 2022-05-31 ASSESSMENT — PAIN DESCRIPTION - DESCRIPTORS
DESCRIPTORS: DISCOMFORT;SHARP
DESCRIPTORS: CRAMPING

## 2022-05-31 ASSESSMENT — PAIN DESCRIPTION - ORIENTATION
ORIENTATION: RIGHT;LOWER
ORIENTATION: RIGHT
ORIENTATION: RIGHT

## 2022-05-31 ASSESSMENT — PAIN DESCRIPTION - FREQUENCY: FREQUENCY: CONTINUOUS

## 2022-05-31 ASSESSMENT — PAIN - FUNCTIONAL ASSESSMENT
PAIN_FUNCTIONAL_ASSESSMENT: 0-10
PAIN_FUNCTIONAL_ASSESSMENT: 0-10

## 2022-05-31 ASSESSMENT — PAIN DESCRIPTION - PAIN TYPE: TYPE: ACUTE PAIN

## 2022-06-01 ENCOUNTER — ANESTHESIA (OUTPATIENT)
Dept: OPERATING ROOM | Age: 22
End: 2022-06-01
Payer: COMMERCIAL

## 2022-06-01 ENCOUNTER — ANESTHESIA EVENT (OUTPATIENT)
Dept: OPERATING ROOM | Age: 22
End: 2022-06-01
Payer: COMMERCIAL

## 2022-06-01 VITALS
DIASTOLIC BLOOD PRESSURE: 76 MMHG | BODY MASS INDEX: 26.33 KG/M2 | OXYGEN SATURATION: 99 % | WEIGHT: 177.8 LBS | RESPIRATION RATE: 18 BRPM | SYSTOLIC BLOOD PRESSURE: 119 MMHG | HEART RATE: 78 BPM | TEMPERATURE: 98.4 F | HEIGHT: 69 IN

## 2022-06-01 PROBLEM — K37 APPENDICITIS: Status: ACTIVE | Noted: 2022-06-01

## 2022-06-01 LAB
ANION GAP SERPL CALCULATED.3IONS-SCNC: 13 MMOL/L (ref 7–16)
ANISOCYTOSIS: ABNORMAL
BASOPHILS ABSOLUTE: 0.01 E9/L (ref 0–0.2)
BASOPHILS RELATIVE PERCENT: 0.1 % (ref 0–2)
BUN BLDV-MCNC: 11 MG/DL (ref 6–20)
CALCIUM SERPL-MCNC: 8.6 MG/DL (ref 8.6–10.2)
CHLORIDE BLD-SCNC: 102 MMOL/L (ref 98–107)
CO2: 20 MMOL/L (ref 22–29)
CREAT SERPL-MCNC: 1.1 MG/DL (ref 0.7–1.2)
EOSINOPHILS ABSOLUTE: 0 E9/L (ref 0.05–0.5)
EOSINOPHILS RELATIVE PERCENT: 0 % (ref 0–6)
GFR AFRICAN AMERICAN: >60
GFR NON-AFRICAN AMERICAN: >60 ML/MIN/1.73
GLUCOSE BLD-MCNC: 132 MG/DL (ref 74–99)
HCT VFR BLD CALC: 39.5 % (ref 37–54)
HEMOGLOBIN: 13.1 G/DL (ref 12.5–16.5)
IMMATURE GRANULOCYTES #: 0.11 E9/L
IMMATURE GRANULOCYTES %: 0.7 % (ref 0–5)
LYMPHOCYTES ABSOLUTE: 0.34 E9/L (ref 1.5–4)
LYMPHOCYTES RELATIVE PERCENT: 2.1 % (ref 20–42)
MCH RBC QN AUTO: 28.7 PG (ref 26–35)
MCHC RBC AUTO-ENTMCNC: 33.2 % (ref 32–34.5)
MCV RBC AUTO: 86.6 FL (ref 80–99.9)
MONOCYTES ABSOLUTE: 0.53 E9/L (ref 0.1–0.95)
MONOCYTES RELATIVE PERCENT: 3.2 % (ref 2–12)
NEUTROPHILS ABSOLUTE: 15.56 E9/L (ref 1.8–7.3)
NEUTROPHILS RELATIVE PERCENT: 93.9 % (ref 43–80)
PDW BLD-RTO: 13.2 FL (ref 11.5–15)
PLATELET # BLD: 222 E9/L (ref 130–450)
PMV BLD AUTO: 10 FL (ref 7–12)
POLYCHROMASIA: ABNORMAL
POTASSIUM SERPL-SCNC: 3.9 MMOL/L (ref 3.5–5)
RBC # BLD: 4.56 E12/L (ref 3.8–5.8)
SODIUM BLD-SCNC: 135 MMOL/L (ref 132–146)
WBC # BLD: 16.6 E9/L (ref 4.5–11.5)

## 2022-06-01 PROCEDURE — 2709999900 HC NON-CHARGEABLE SUPPLY: Performed by: SURGERY

## 2022-06-01 PROCEDURE — 6370000000 HC RX 637 (ALT 250 FOR IP): Performed by: SURGERY

## 2022-06-01 PROCEDURE — 2580000003 HC RX 258

## 2022-06-01 PROCEDURE — 2580000003 HC RX 258: Performed by: SURGERY

## 2022-06-01 PROCEDURE — G0378 HOSPITAL OBSERVATION PER HR: HCPCS

## 2022-06-01 PROCEDURE — 7100000001 HC PACU RECOVERY - ADDTL 15 MIN: Performed by: SURGERY

## 2022-06-01 PROCEDURE — 85025 COMPLETE CBC W/AUTO DIFF WBC: CPT

## 2022-06-01 PROCEDURE — 2500000003 HC RX 250 WO HCPCS: Performed by: SURGERY

## 2022-06-01 PROCEDURE — 80048 BASIC METABOLIC PNL TOTAL CA: CPT

## 2022-06-01 PROCEDURE — 2720000010 HC SURG SUPPLY STERILE: Performed by: SURGERY

## 2022-06-01 PROCEDURE — 3600000003 HC SURGERY LEVEL 3 BASE: Performed by: SURGERY

## 2022-06-01 PROCEDURE — 6360000002 HC RX W HCPCS: Performed by: EMERGENCY MEDICINE

## 2022-06-01 PROCEDURE — 2500000003 HC RX 250 WO HCPCS

## 2022-06-01 PROCEDURE — 3600000013 HC SURGERY LEVEL 3 ADDTL 15MIN: Performed by: SURGERY

## 2022-06-01 PROCEDURE — 3700000001 HC ADD 15 MINUTES (ANESTHESIA): Performed by: SURGERY

## 2022-06-01 PROCEDURE — 7100000000 HC PACU RECOVERY - FIRST 15 MIN: Performed by: SURGERY

## 2022-06-01 PROCEDURE — 44970 LAPAROSCOPY APPENDECTOMY: CPT | Performed by: SURGERY

## 2022-06-01 PROCEDURE — 96376 TX/PRO/DX INJ SAME DRUG ADON: CPT

## 2022-06-01 PROCEDURE — 36415 COLL VENOUS BLD VENIPUNCTURE: CPT

## 2022-06-01 PROCEDURE — 3700000000 HC ANESTHESIA ATTENDED CARE: Performed by: SURGERY

## 2022-06-01 PROCEDURE — 6360000002 HC RX W HCPCS

## 2022-06-01 RX ORDER — FENTANYL CITRATE 50 UG/ML
INJECTION, SOLUTION INTRAMUSCULAR; INTRAVENOUS PRN
Status: DISCONTINUED | OUTPATIENT
Start: 2022-06-01 | End: 2022-06-01 | Stop reason: SDUPTHER

## 2022-06-01 RX ORDER — SODIUM CHLORIDE, SODIUM LACTATE, POTASSIUM CHLORIDE, CALCIUM CHLORIDE 600; 310; 30; 20 MG/100ML; MG/100ML; MG/100ML; MG/100ML
INJECTION, SOLUTION INTRAVENOUS CONTINUOUS PRN
Status: DISCONTINUED | OUTPATIENT
Start: 2022-06-01 | End: 2022-06-01 | Stop reason: SDUPTHER

## 2022-06-01 RX ORDER — PROPOFOL 10 MG/ML
INJECTION, EMULSION INTRAVENOUS PRN
Status: DISCONTINUED | OUTPATIENT
Start: 2022-06-01 | End: 2022-06-01 | Stop reason: SDUPTHER

## 2022-06-01 RX ORDER — PROCHLORPERAZINE EDISYLATE 5 MG/ML
5 INJECTION INTRAMUSCULAR; INTRAVENOUS
Status: DISCONTINUED | OUTPATIENT
Start: 2022-06-01 | End: 2022-06-01 | Stop reason: HOSPADM

## 2022-06-01 RX ORDER — IPRATROPIUM BROMIDE AND ALBUTEROL SULFATE 2.5; .5 MG/3ML; MG/3ML
1 SOLUTION RESPIRATORY (INHALATION)
Status: DISCONTINUED | OUTPATIENT
Start: 2022-06-01 | End: 2022-06-01 | Stop reason: HOSPADM

## 2022-06-01 RX ORDER — CEFAZOLIN SODIUM 1 G/3ML
INJECTION, POWDER, FOR SOLUTION INTRAMUSCULAR; INTRAVENOUS PRN
Status: DISCONTINUED | OUTPATIENT
Start: 2022-06-01 | End: 2022-06-01 | Stop reason: SDUPTHER

## 2022-06-01 RX ORDER — HYDRALAZINE HYDROCHLORIDE 20 MG/ML
10 INJECTION INTRAMUSCULAR; INTRAVENOUS
Status: DISCONTINUED | OUTPATIENT
Start: 2022-06-01 | End: 2022-06-01 | Stop reason: HOSPADM

## 2022-06-01 RX ORDER — HYDROMORPHONE HCL 110MG/55ML
PATIENT CONTROLLED ANALGESIA SYRINGE INTRAVENOUS PRN
Status: DISCONTINUED | OUTPATIENT
Start: 2022-06-01 | End: 2022-06-01 | Stop reason: SDUPTHER

## 2022-06-01 RX ORDER — SODIUM CHLORIDE 0.9 % (FLUSH) 0.9 %
5-40 SYRINGE (ML) INJECTION PRN
Status: DISCONTINUED | OUTPATIENT
Start: 2022-06-01 | End: 2022-06-01 | Stop reason: HOSPADM

## 2022-06-01 RX ORDER — DIPHENHYDRAMINE HYDROCHLORIDE 50 MG/ML
12.5 INJECTION INTRAMUSCULAR; INTRAVENOUS
Status: DISCONTINUED | OUTPATIENT
Start: 2022-06-01 | End: 2022-06-01 | Stop reason: HOSPADM

## 2022-06-01 RX ORDER — OXYCODONE HYDROCHLORIDE AND ACETAMINOPHEN 5; 325 MG/1; MG/1
1 TABLET ORAL EVERY 6 HOURS PRN
Status: DISCONTINUED | OUTPATIENT
Start: 2022-06-01 | End: 2022-06-01 | Stop reason: HOSPADM

## 2022-06-01 RX ORDER — MIDAZOLAM HYDROCHLORIDE 1 MG/ML
INJECTION INTRAMUSCULAR; INTRAVENOUS PRN
Status: DISCONTINUED | OUTPATIENT
Start: 2022-06-01 | End: 2022-06-01 | Stop reason: SDUPTHER

## 2022-06-01 RX ORDER — SODIUM CHLORIDE 0.9 % (FLUSH) 0.9 %
5-40 SYRINGE (ML) INJECTION EVERY 12 HOURS SCHEDULED
Status: DISCONTINUED | OUTPATIENT
Start: 2022-06-01 | End: 2022-06-01 | Stop reason: HOSPADM

## 2022-06-01 RX ORDER — SODIUM CHLORIDE 9 MG/ML
INJECTION, SOLUTION INTRAVENOUS PRN
Status: DISCONTINUED | OUTPATIENT
Start: 2022-06-01 | End: 2022-06-01 | Stop reason: HOSPADM

## 2022-06-01 RX ORDER — ONDANSETRON 4 MG/1
4 TABLET, ORALLY DISINTEGRATING ORAL EVERY 8 HOURS PRN
Status: DISCONTINUED | OUTPATIENT
Start: 2022-06-01 | End: 2022-06-01 | Stop reason: HOSPADM

## 2022-06-01 RX ORDER — LORAZEPAM 2 MG/ML
0.5 INJECTION INTRAMUSCULAR
Status: DISCONTINUED | OUTPATIENT
Start: 2022-06-01 | End: 2022-06-01 | Stop reason: HOSPADM

## 2022-06-01 RX ORDER — LIDOCAINE HYDROCHLORIDE 20 MG/ML
INJECTION, SOLUTION INTRAVENOUS PRN
Status: DISCONTINUED | OUTPATIENT
Start: 2022-06-01 | End: 2022-06-01 | Stop reason: SDUPTHER

## 2022-06-01 RX ORDER — FENTANYL CITRATE 50 UG/ML
25 INJECTION, SOLUTION INTRAMUSCULAR; INTRAVENOUS ONCE
Status: COMPLETED | OUTPATIENT
Start: 2022-06-01 | End: 2022-06-01

## 2022-06-01 RX ORDER — PHENYLEPHRINE HCL IN 0.9% NACL 1 MG/10 ML
SYRINGE (ML) INTRAVENOUS PRN
Status: DISCONTINUED | OUTPATIENT
Start: 2022-06-01 | End: 2022-06-01 | Stop reason: SDUPTHER

## 2022-06-01 RX ORDER — OXYCODONE HYDROCHLORIDE AND ACETAMINOPHEN 5; 325 MG/1; MG/1
1 TABLET ORAL EVERY 6 HOURS PRN
Qty: 12 TABLET | Refills: 0 | Status: SHIPPED | OUTPATIENT
Start: 2022-06-01 | End: 2022-06-04

## 2022-06-01 RX ORDER — ONDANSETRON 2 MG/ML
4 INJECTION INTRAMUSCULAR; INTRAVENOUS
Status: DISCONTINUED | OUTPATIENT
Start: 2022-06-01 | End: 2022-06-01 | Stop reason: HOSPADM

## 2022-06-01 RX ORDER — LABETALOL HYDROCHLORIDE 5 MG/ML
10 INJECTION, SOLUTION INTRAVENOUS
Status: DISCONTINUED | OUTPATIENT
Start: 2022-06-01 | End: 2022-06-01 | Stop reason: HOSPADM

## 2022-06-01 RX ORDER — ACETAMINOPHEN 325 MG/1
650 TABLET ORAL EVERY 4 HOURS PRN
Status: DISCONTINUED | OUTPATIENT
Start: 2022-06-01 | End: 2022-06-01 | Stop reason: HOSPADM

## 2022-06-01 RX ORDER — ONDANSETRON 2 MG/ML
INJECTION INTRAMUSCULAR; INTRAVENOUS PRN
Status: DISCONTINUED | OUTPATIENT
Start: 2022-06-01 | End: 2022-06-01 | Stop reason: SDUPTHER

## 2022-06-01 RX ORDER — MEPERIDINE HYDROCHLORIDE 25 MG/ML
12.5 INJECTION INTRAMUSCULAR; INTRAVENOUS; SUBCUTANEOUS EVERY 5 MIN PRN
Status: DISCONTINUED | OUTPATIENT
Start: 2022-06-01 | End: 2022-06-01 | Stop reason: HOSPADM

## 2022-06-01 RX ORDER — ROCURONIUM BROMIDE 10 MG/ML
INJECTION, SOLUTION INTRAVENOUS PRN
Status: DISCONTINUED | OUTPATIENT
Start: 2022-06-01 | End: 2022-06-01 | Stop reason: SDUPTHER

## 2022-06-01 RX ORDER — SODIUM CHLORIDE, SODIUM LACTATE, POTASSIUM CHLORIDE, CALCIUM CHLORIDE 600; 310; 30; 20 MG/100ML; MG/100ML; MG/100ML; MG/100ML
INJECTION, SOLUTION INTRAVENOUS CONTINUOUS
Status: DISCONTINUED | OUTPATIENT
Start: 2022-06-01 | End: 2022-06-01 | Stop reason: HOSPADM

## 2022-06-01 RX ORDER — KETOROLAC TROMETHAMINE 30 MG/ML
30 INJECTION, SOLUTION INTRAMUSCULAR; INTRAVENOUS
Status: DISCONTINUED | OUTPATIENT
Start: 2022-06-01 | End: 2022-06-01 | Stop reason: HOSPADM

## 2022-06-01 RX ORDER — DEXAMETHASONE SODIUM PHOSPHATE 10 MG/ML
INJECTION INTRAMUSCULAR; INTRAVENOUS PRN
Status: DISCONTINUED | OUTPATIENT
Start: 2022-06-01 | End: 2022-06-01 | Stop reason: SDUPTHER

## 2022-06-01 RX ORDER — BUPIVACAINE HYDROCHLORIDE 5 MG/ML
INJECTION, SOLUTION EPIDURAL; INTRACAUDAL PRN
Status: DISCONTINUED | OUTPATIENT
Start: 2022-06-01 | End: 2022-06-01 | Stop reason: ALTCHOICE

## 2022-06-01 RX ORDER — ONDANSETRON 2 MG/ML
4 INJECTION INTRAMUSCULAR; INTRAVENOUS EVERY 6 HOURS PRN
Status: DISCONTINUED | OUTPATIENT
Start: 2022-06-01 | End: 2022-06-01 | Stop reason: HOSPADM

## 2022-06-01 RX ORDER — MORPHINE SULFATE 2 MG/ML
2 INJECTION, SOLUTION INTRAMUSCULAR; INTRAVENOUS EVERY 5 MIN PRN
Status: DISCONTINUED | OUTPATIENT
Start: 2022-06-01 | End: 2022-06-01 | Stop reason: HOSPADM

## 2022-06-01 RX ADMIN — CEFAZOLIN 2000 MG: 1 INJECTION, POWDER, FOR SOLUTION INTRAMUSCULAR; INTRAVENOUS at 03:02

## 2022-06-01 RX ADMIN — FENTANYL CITRATE 75 MCG: 50 INJECTION, SOLUTION INTRAMUSCULAR; INTRAVENOUS at 02:51

## 2022-06-01 RX ADMIN — ONDANSETRON 4 MG: 2 INJECTION INTRAMUSCULAR; INTRAVENOUS at 03:07

## 2022-06-01 RX ADMIN — MIDAZOLAM 2 MG: 1 INJECTION INTRAMUSCULAR; INTRAVENOUS at 02:47

## 2022-06-01 RX ADMIN — ROCURONIUM BROMIDE 35 MG: 10 SOLUTION INTRAVENOUS at 02:51

## 2022-06-01 RX ADMIN — HYDROMORPHONE HYDROCHLORIDE 1 MG: 2 INJECTION, SOLUTION INTRAMUSCULAR; INTRAVENOUS; SUBCUTANEOUS at 03:11

## 2022-06-01 RX ADMIN — SODIUM CHLORIDE, POTASSIUM CHLORIDE, SODIUM LACTATE AND CALCIUM CHLORIDE: 600; 310; 30; 20 INJECTION, SOLUTION INTRAVENOUS at 05:43

## 2022-06-01 RX ADMIN — PROPOFOL 160 MG: 10 INJECTION, EMULSION INTRAVENOUS at 02:51

## 2022-06-01 RX ADMIN — LIDOCAINE HYDROCHLORIDE 60 MG: 20 INJECTION, SOLUTION INTRAVENOUS at 02:51

## 2022-06-01 RX ADMIN — FENTANYL CITRATE 25 MCG: 50 INJECTION, SOLUTION INTRAMUSCULAR; INTRAVENOUS at 01:37

## 2022-06-01 RX ADMIN — HYDROMORPHONE HYDROCHLORIDE 1 MG: 2 INJECTION, SOLUTION INTRAMUSCULAR; INTRAVENOUS; SUBCUTANEOUS at 03:01

## 2022-06-01 RX ADMIN — FENTANYL CITRATE 25 MCG: 50 INJECTION, SOLUTION INTRAMUSCULAR; INTRAVENOUS at 02:47

## 2022-06-01 RX ADMIN — OXYCODONE AND ACETAMINOPHEN 1 TABLET: 5; 325 TABLET ORAL at 05:43

## 2022-06-01 RX ADMIN — Medication 100 MCG: at 03:07

## 2022-06-01 RX ADMIN — SODIUM CHLORIDE, POTASSIUM CHLORIDE, SODIUM LACTATE AND CALCIUM CHLORIDE: 600; 310; 30; 20 INJECTION, SOLUTION INTRAVENOUS at 02:47

## 2022-06-01 RX ADMIN — DEXAMETHASONE SODIUM PHOSPHATE 10 MG: 10 INJECTION INTRAMUSCULAR; INTRAVENOUS at 02:51

## 2022-06-01 ASSESSMENT — PAIN DESCRIPTION - PAIN TYPE
TYPE: SURGICAL PAIN
TYPE: ACUTE PAIN

## 2022-06-01 ASSESSMENT — PAIN DESCRIPTION - LOCATION
LOCATION: ABDOMEN

## 2022-06-01 ASSESSMENT — PAIN DESCRIPTION - DESCRIPTORS
DESCRIPTORS: ACHING
DESCRIPTORS: ACHING;DISCOMFORT;SORE
DESCRIPTORS: ACHING;DISCOMFORT

## 2022-06-01 ASSESSMENT — PAIN SCALES - GENERAL
PAINLEVEL_OUTOF10: 7
PAINLEVEL_OUTOF10: 4
PAINLEVEL_OUTOF10: 6
PAINLEVEL_OUTOF10: 8
PAINLEVEL_OUTOF10: 0
PAINLEVEL_OUTOF10: 8

## 2022-06-01 ASSESSMENT — PAIN DESCRIPTION - ORIENTATION
ORIENTATION: LOWER
ORIENTATION: LOWER
ORIENTATION: RIGHT

## 2022-06-01 ASSESSMENT — PAIN DESCRIPTION - ONSET
ONSET: SUDDEN
ONSET: ON-GOING

## 2022-06-01 ASSESSMENT — PAIN - FUNCTIONAL ASSESSMENT
PAIN_FUNCTIONAL_ASSESSMENT: 0-10
PAIN_FUNCTIONAL_ASSESSMENT: ACTIVITIES ARE NOT PREVENTED
PAIN_FUNCTIONAL_ASSESSMENT: ACTIVITIES ARE NOT PREVENTED

## 2022-06-01 ASSESSMENT — PAIN DESCRIPTION - FREQUENCY
FREQUENCY: CONTINUOUS
FREQUENCY: CONTINUOUS

## 2022-06-01 NOTE — OP NOTE
Operative Note      Patient: James Schmidt  YOB: 2000  MRN: [id number]    Date of Procedure: 6/1/2022    Pre-Op Diagnosis: APPENDICITIS    Post-Op Diagnosis: Same       Procedure(s):  APPENDECTOMY LAPAROSCOPIC    Surgeon(s):  Linh Vaughan MD    Assistant:   Resident: Rosie Alvarez MD    Anesthesia: General    Estimated Blood Loss (mL): Minimal    Complications: None    Specimens:   ID Type Source Tests Collected by Time Destination   A : APPENDIX Tissue Appendix SURGICAL PATHOLOGY Linh Vaughan MD 6/1/2022 0940        Implants:  * No implants in log *      Drains:   [REMOVED] Urinary Catheter Ceballos (Removed)       Findings: Retrocecal appendix, otherwise uncomplicated laparoscopic appendectomy    DISPOSITION: James Schmidt was to be admitted to the floor for postoperative care. INDICATIONS FOR [address] [place] [address] Kailey Duval is a 24 y.o. male with signs and symptoms consistent with acute appendicitis. We explained the risks, benefits, potential outcomes, and alternatives of treatment to the aforementioned procedure, including but not limited to abscess, injury to surrounding structures, bleeding, infection, and/or bstruction with all possibly requiring further procedures. The patient agreed to proceed with the procure understanding those risks and potential outcomes. PROCEDURE: The patient was brought into the operative suite. He had already been placed on preoperative antibiotics. He was placed under general anesthesia. He was then prepped and draped in a normal sterile condition. Once this was done, a 5-mm incision was made supraumbilically. A Veress needle was passed through this into the peritoneum. A meniscus test verified the proper location. CO2 was then used to insufflate the abdomen to a pressure of 15 mmHg. At that time, the Veress needle was removed. A 5-mm trocar was put in its place. A laparoscope was passed down through that trocar. Then a 5-mm trocar was placed suprapubically.  A 10-mm trocar was placed in the left lower quadrant of the abdomen. Blunt dissection and LigaSure device was able to identify the base of the appendix and make a window through the base of the mesoappendix. Once that was done, an Endo stapler was used and was fired to get across the base of the appendix at the cecum, making sure not to take any cuff of the cecum. Once this was done, the mesoappendix was taken using the LigaSure device. At this point, the area was suction irrigated until the aspirate returned clear. There was no leaking seen at the staple line. The rest of the abdomen appeared normal. The fascia at the 10-mm port was closed with an #0 Vicryl suture on a [employer] device. The 2 suprapubic and left lower quadrant ports were then removed under direct visualization with the laparoscope. The laparoscope and the 5-mm port at the umbilicus were then removed. . Then the skin was approximated with subcuticular stitches with 4-0 Vicryl. The patient was then awakened in stable condition to be taken to the PACU. Dr. Joann Bello was present for the procedure.     Electronically signed by Arnav Estes MD on 6/1/2022 at 3:49 AM

## 2022-06-01 NOTE — ED NOTES
[facility] was notified for transfer to [facility] ED for transfer, ED to ED     Tina Cummings RN  06/01/22 0003

## 2022-06-01 NOTE — ANESTHESIA POSTPROCEDURE EVALUATION
Department of Anesthesiology  Postprocedure Note    Patient: Emily Ross  MRN: [id number]  YOB: 2000  Date of evaluation: 6/1/2022  Time:  6:16 AM     Procedure Summary     Date: 06/01/22 [facility]    Anesthesia Start: 8567 Anesthesia Stop: 6149    Procedure: APPENDECTOMY LAPAROSCOPIC (N/A Abdomen) Diagnosis:       Appendicitis, unspecified appendicitis type      (APPENDICITIS)    Surgeons: Shar Connelly MD Responsible Provider: Donaldo Rodas MD    Anesthesia Type: general ASA Status: 2 - Emergent          Anesthesia Type: No value filed. Destiny Phase I: Destiny Score: 9    Destiny Phase II:      Last vitals: Reviewed and per EMR flowsheets.        Anesthesia Post Evaluation    Patient location during evaluation: PACU  Patient participation: complete - patient participated  Level of consciousness: awake  Airway patency: patent  Nausea & Vomiting: no nausea and no vomiting  Complications: no  Cardiovascular status: hemodynamically stable  Respiratory status: acceptable  Hydration status: euvolemic

## 2022-06-01 NOTE — H&P
CC: RLQ pain    HPI:  24 yr old male presented to [facility] ED with complaint of abd pain that localized to RLQ. Pain started around 0300 5/31/22. Pain started in mid-abd and worsened in severity. Localized to RLQ. Pt reports nausea and vomiting and diarrhea after pain started. Reports some chills; Tmax at home 100.1. Pt presented to [facility] ED and workup revealed WBCs 13.9 and CT scan (personally reviewed/interpreted) consistent with appendicitis. Pt transferred to [address] [place] [address] for surgery. Past Medical History:   Diagnosis Date    Asthma        History reviewed. No pertinent surgical history. No current facility-administered medications for this encounter. Current Outpatient Medications   Medication Sig Dispense Refill    FLUoxetine (PROZAC) 10 MG capsule TAKE ONE CAPSULE BY MOUTH DAILY FOR 30 DAYS      ARIPiprazole (ABILIFY) 10 MG tablet Take by mouth daily       ibuprofen (ADVIL;MOTRIN) 800 MG tablet Take 1 tablet by mouth every 8 hours as needed for Pain 15 tablet 0    albuterol sulfate HFA (PROAIR HFA) 108 (90 Base) MCG/ACT inhaler Inhale 2 puffs into the lungs every 6 hours as needed for Wheezing 1 Inhaler 1    Lisdexamfetamine Dimesylate (VYVANSE) 20 MG CAPS Take 20 mg by mouth daily         No Known Allergies    History reviewed. No pertinent family history.     Social History     Socioeconomic History    Marital status: Single     Spouse name: Not on file    Number of children: Not on file    Years of education: Not on file    Highest education level: Not on file   Occupational History    Not on file   Tobacco Use    Smoking status: Never Smoker    Smokeless tobacco: Never Used   Substance and Sexual Activity    Alcohol use: No    Drug use: Not on file    Sexual activity: Not on file   Other Topics Concern    Not on file   Social History Narrative    Not on file     Social Determinants of Health     Financial Resource Strain:     Difficulty of Paying Living Expenses: Not on file   Food Insecurity:     Worried About Running Out of Food in the Last Year: Not on file    Vlad of Food in the Last Year: Not on file   Transportation Needs:     Lack of Transportation (Medical): Not on file    Lack of Transportation (Non-Medical): Not on file   Physical Activity:     Days of Exercise per Week: Not on file    Minutes of Exercise per Session: Not on file   Stress:     Feeling of Stress : Not on file   Social Connections:     Frequency of Communication with Friends and Family: Not on file    Frequency of Social Gatherings with Friends and Family: Not on file    Attends Sikh Services: Not on file    Active Member of [address] [place] [address] [employer] or Organizations: Not on file    Attends Club or Organization Meetings: Not on file    Marital Status: Not on file   Intimate Partner Violence:     Fear of Current or Ex-Partner: Not on file    Emotionally Abused: Not on file    Physically Abused: Not on file    Sexually Abused: Not on file   Housing Stability:     Unable to Pay for Housing in the Last Year: Not on file    Number of [place] in the Last Year: Not on file    Unstable Housing in the Last Year: Not on file     Complete ROS negative unless noted in HPI or below here. Vitals:    06/01/22 0209   BP: 117/77   Pulse: (!) 102   Resp: 16   Temp: 99.1 °F (37.3 °C)   SpO2: 96%     Gen:  Adult male in no distress  HEENT:  PERRL; EOMI; moist mucous membranes  Neck:  Supple; FROM  Hrt:  Regular rate/rhythm; no murmur  Lungs:  Fairly clear bilaterally  Abd:  Soft; BS hypoactive; tender to palpation with rebound/guarding in RLQ  Skin:  Warm/dry  Ext:  Moving all 4 ext    Patient Active Problem List    Diagnosis Date Noted    Appendicitis 06/01/2022       Acute appendicitis--recommend lap appy. Risks/benefits/alternatives/expected outcomes explained to pt. Risks include, but are not limited to, bleeding, infection, damage to bowel or surrounding structures, and reaction to anesthesia medicine. Pt verbalized understanding and agrees to proceed.     Boo Knight MD, FACS  6/1/2022  2:27 AM

## 2022-06-01 NOTE — ED NOTES
Report given to PHYSICIANS [facility] SURGICAL \[facility]\"" ER.      C[facility]  06/01/22 7338

## 2022-06-01 NOTE — ANESTHESIA PRE PROCEDURE
Department of Anesthesiology  Preprocedure Note       Name:  Poonam Ramirez   Age:  24 y.o.  :  2000                                          MRN:  [id number]         Date:  2022      Surgeon: Jessica Meyer):  Varghese Funes MD    Procedure: Procedure(s):  APPENDECTOMY LAPAROSCOPIC    Medications prior to admission:   Prior to Admission medications    Medication Sig Start Date End Date Taking?  Authorizing Provider   FLUoxetine (PROZAC) 10 MG capsule TAKE ONE CAPSULE BY MOUTH DAILY FOR 30 DAYS 21   Historical Provider, MD   ARIPiprazole (ABILIFY) 10 MG tablet Take by mouth daily     Historical Provider, MD   ibuprofen (ADVIL;MOTRIN) 800 MG tablet Take 1 tablet by mouth every 8 hours as needed for Pain 20  Rivera Ford MD   albuterol sulfate HFA (PROAIR HFA) 108 (90 Base) MCG/ACT inhaler Inhale 2 puffs into the lungs every 6 hours as needed for Wheezing 20   Rivera Ford MD   Lisdexamfetamine Dimesylate (VYVANSE) 20 MG CAPS Take 20 mg by mouth daily    Historical Provider, MD       Current medications:    Current Facility-Administered Medications   Medication Dose Route Frequency Provider Last Rate Last Admin    sodium chloride flush 0.9 % injection 5-40 mL  5-40 mL IntraVENous 2 times per day Rosalia Talley MD        sodium chloride flush 0.9 % injection 5-40 mL  5-40 mL IntraVENous PRN Rosalia Talley MD        0.9 % sodium chloride infusion   IntraVENous PRN Rosalia Talley MD        ondansetron (ZOFRAN-ODT) disintegrating tablet 4 mg  4 mg Oral Q8H PRN Rosalia Talley MD        Or    ondansetron (ZOFRAN) injection 4 mg  4 mg IntraVENous Q6H PRN Rosalia Talley MD        acetaminophen (TYLENOL) tablet 650 mg  650 mg Oral Q4H PRN Rosalia Talley MD        oxyCODONE-acetaminophen (PERCOCET) 5-325 MG per tablet 1 tablet  1 tablet Oral Q6H PRN Rosalia Talley MD        lactated ringers infusion   IntraVENous Continuous Rosalia Talley MD         Current Outpatient Medications   Medication Sig Dispense Refill    FLUoxetine (PROZAC) 10 MG capsule TAKE ONE CAPSULE BY MOUTH DAILY FOR 30 DAYS      ARIPiprazole (ABILIFY) 10 MG tablet Take by mouth daily       ibuprofen (ADVIL;MOTRIN) 800 MG tablet Take 1 tablet by mouth every 8 hours as needed for Pain 15 tablet 0    albuterol sulfate HFA (PROAIR HFA) 108 (90 Base) MCG/ACT inhaler Inhale 2 puffs into the lungs every 6 hours as needed for Wheezing 1 Inhaler 1    Lisdexamfetamine Dimesylate (VYVANSE) 20 MG CAPS Take 20 mg by mouth daily         Allergies:  No Known Allergies    Problem List:    Patient Active Problem List   Diagnosis Code    Appendicitis K37       Past Medical History:        Diagnosis Date    Asthma        Past Surgical History:  History reviewed. No pertinent surgical history. Social History:    Social History     Tobacco Use    Smoking status: Never Smoker    Smokeless tobacco: Never Used   Substance Use Topics    Alcohol use: No                                Counseling given: Not Answered      Vital Signs (Current):   Vitals:    05/31/22 2232 05/31/22 2338 06/01/22 0137 06/01/22 0209   BP: 129/64 118/78 (!) 121/51 117/77   Pulse: 94 84 (!) 109 (!) 102   Resp: 16 16 18 16   Temp:    37.3 °C (99.1 °F)   TempSrc:    Oral   SpO2: 96% 99% 98% 96%   Weight:                                                  BP Readings from Last 3 Encounters:   06/01/22 117/77   05/01/22 136/83   10/11/21 128/82       NPO Status:  > 8 hours                                                                               BMI:   Wt Readings from Last 3 Encounters:   05/31/22 180 lb (81.6 kg)   05/01/22 180 lb (81.6 kg)   10/11/21 180 lb (81.6 kg)     Body mass index is 25.83 kg/m².     CBC:   Lab Results   Component Value Date    WBC 13.9 05/31/2022    RBC 5.03 05/31/2022    HGB 14.8 05/31/2022    HCT 41.9 05/31/2022    MCV 83.3 05/31/2022    RDW 13.2 05/31/2022     05/31/2022       CMP:   Lab Results   Component Value Date     05/31/2022    K 4.0 05/31/2022     05/31/2022    CO2 21 05/31/2022    BUN 13 05/31/2022    CREATININE 1.1 05/31/2022    GFRAA >60 05/31/2022    LABGLOM >60 05/31/2022    GLUCOSE 103 05/31/2022    PROT 7.6 05/31/2022    CALCIUM 9.6 05/31/2022    BILITOT 0.9 05/31/2022    ALKPHOS 87 05/31/2022    AST 24 05/31/2022    ALT 19 05/31/2022       POC Tests: No results for input(s): POCGLU, POCNA, POCK, POCCL, POCBUN, POCHEMO, POCHCT in the last 72 hours. Coags: No results found for: PROTIME, INR, APTT    HCG (If Applicable): No results found for: PREGTESTUR, PREGSERUM, HCG, HCGQUANT     ABGs: No results found for: PHART, PO2ART, RWF0TQD, CNN0WSY, BEART, U7KSBGDB     Type & Screen (If Applicable):  No results found for: LABABO, LABRH    Drug/Infectious Status (If Applicable):  No results found for: HIV, HEPCAB    COVID-19 Screening (If Applicable):   Lab Results   Component Value Date    COVID19 Detected 10/11/2021           Anesthesia Evaluation  Patient summary reviewed and Nursing notes reviewed no history of anesthetic complications:   Airway: Mallampati: II  TM distance: >3 FB   Neck ROM: full  Mouth opening: > = 3 FB   Dental: normal exam         Pulmonary: breath sounds clear to auscultation  (+) asthma:                            Cardiovascular:Negative CV ROS  Exercise tolerance: good (>4 METS),           Rhythm: regular  Rate: normal           Beta Blocker:  Not on Beta Blocker         Neuro/Psych:   Negative Neuro/Psych ROS              GI/Hepatic/Renal:            ROS comment: RLQ pain. Endo/Other: Negative Endo/Other ROS                    Abdominal:       Abdomen: tender. Vascular: negative vascular ROS. Other Findings:           Anesthesia Plan      general     ASA 2 - emergent       Induction: intravenous. MIPS: Postoperative opioids intended, Prophylactic antiemetics administered and Postoperative trial extubation.   Anesthetic plan and risks discussed with patient. Use of blood products discussed with patient whom consented to blood products. Plan discussed with attending and CRNA.                     HERNESTO Butcher - CRNA   6/1/2022

## 2022-06-01 NOTE — ED PROVIDER NOTES
ED Attending shared visit  CC: No           [address] [facility]  Department of Emergency Medicine   ED  Encounter Note  Admit Date/RoomTime: 2022  7:35 PM  ED Room:   NAME: True Carbajal  : 2000  MRN: [id number]     Chief Complaint:  Abdominal Pain (started at 3 am, lower right side into middle abdomen, also had started emesis and diarrhea)    HISTORY OF PRESENT ILLNESS        True Carbajal is a 24 y.o. male who presents to the ED for evaluation of pain that began this morning at 3 AM.  Patient ate [employer] ([facility]) food at 1 AM and started having diffuse abdominal pain 2 hours later. He drank some magnesium citrate for his symptoms and since then has developed nausea, vomiting and diarrhea with chills and subjective fever. There is no known exposure to COVID-19 or close contacts with similar illness. Family member ate the same food without symptoms. His pain has since worsened in the right side of his abdomen. There is been no syncope, chest pain, shortness of breath, hematemesis, bright red per rectum, dysuria, hematuria or traumatic incident. He has not had any surgical history to his abdomen in the past.  His symptoms are moderate in severity and persistent nature. ROS   Pertinent positives and negatives are stated within HPI, all other systems reviewed and are negative. Past Medical History:  has a past medical history of Asthma. Surgical History:  has no past surgical history on file. Social History:  reports that he has never smoked. He has never used smokeless tobacco. He reports that he does not drink alcohol. Family History: family history is not on file. Allergies: Patient has no known allergies. PHYSICAL EXAM   Oxygen Saturation Interpretation: Normal on room air analysis.         ED Triage Vitals   BP Temp Temp Source Heart Rate Resp SpO2 Height Weight   22 1835 22 1835 22 1835 22 1835 22 1835 22 1835 -- 22 1854 121/74 98.1 °F (36.7 °C) Oral (!) 108 14 97 %  180 lb (81.6 kg)       Physical Exam  Constitutional/General: Alert and oriented x3, well appearing, non toxic  HEENT:  NC/NT. PERRLA. Airway patent. Neck: Supple, full ROM. No midline vertebral tenderness or crepitus. Respiratory: Lung sounds clear to auscultation bilaterally. No wheezes, rhonchi or stridor. Not in respiratory distress. CV:  Regular rate. Regular rhythm. No murmurs or rubs. 2+ distal pulses. GI:  Abdomen soft, moderate tenderness to the right side of the abdomen with positive Torres sign and pain at McBurney's point with negative Rovsing sign, non-distended. +BS. No rebound, or rigidity. No pulsatile masses. Musculoskeletal: Moves all extremities x 4. Warm and well perfused. Capillary refill <3 seconds  Integument: Skin warm and dry. No rashes. Neurologic: Alert and oriented with no focal deficits, symmetric strength 5/5 in the upper and lower extremities bilaterally. Psychiatric: Normal affect.     Lab / Imaging Results   (All laboratory and radiology results have been personally reviewed by myself)  Labs:  Results for orders placed or performed during the hospital encounter of 05/31/22   Comprehensive Metabolic Panel w/ Reflex to MG   Result Value Ref Range    Sodium 138 132 - 146 mmol/L    Potassium reflex Magnesium 4.0 3.5 - 5.0 mmol/L    Chloride 101 98 - 107 mmol/L    CO2 21 (L) 22 - 29 mmol/L    Anion Gap 16 7 - 16 mmol/L    Glucose 103 (H) 74 - 99 mg/dL    BUN 13 6 - 20 mg/dL    CREATININE 1.1 0.7 - 1.2 mg/dL    GFR Non-African American >60 >=60 mL/min/1.73    GFR African American >60     Calcium 9.6 8.6 - 10.2 mg/dL    Total Protein 7.6 6.4 - 8.3 g/dL    Albumin 4.8 3.5 - 5.2 g/dL    Total Bilirubin 0.9 0.0 - 1.2 mg/dL    Alkaline Phosphatase 87 40 - 129 U/L    ALT 19 0 - 40 U/L    AST 24 0 - 39 U/L   CBC with Auto Differential   Result Value Ref Range    WBC 13.9 (H) 4.5 - 11.5 E9/L    RBC 5.03 3.80 - 5.80 E12/L    Hemoglobin 14.8 time range)   metronidazole (FLAGYL) 500 mg in 0.9% NaCl 100 mL IVPB premix (has no administration in time range)   fentaNYL (SUBLIMAZE) injection 75 mcg (has no administration in time range)   0.9 % sodium chloride bolus (0 mLs IntraVENous Stopped 5/31/22 2222)   morphine sulfate (PF) injection 4 mg (4 mg IntraVENous Given 5/31/22 2041)   ondansetron (ZOFRAN) injection 4 mg (4 mg IntraVENous Given 5/31/22 2048)   iopamidol (ISOVUE-370) 76 % injection 75 mL (75 mLs IntraVENous Given 5/31/22 2130)   sodium chloride flush 0.9 % injection 10 mL (10 mLs IntraVENous Given 5/31/22 2130)   morphine sulfate (PF) injection 4 mg (4 mg IntraVENous Given 5/31/22 2223)        Re-examination:  5/31/22       Time: 2215  Patients condition was initially improving however he states his pain has returned. Patient awaiting CT result. Discussed labs. Patient to remain NPO. Time: 2245  Patient aware of CT results and elects [facility] and has no general surgeon preference. Time: 2315 Patient evaluated by Dr. Miguel Angel Chávez. Consult(s):   Time: 2310 Spoke to Dr. Lawrance Lanes who accepts the patient and prefers ER to ER transfer and states he is in the ER and will let Dr. Jelly Vieira know about the patient transfer. Procedure(s):   none    MDM:   Patient evaluated with differential diagnosis of gastroenteritis, cholecystitis and appendicitis to mention a few. Labs and diagnostics as resulted above with leukocytosis and normal lactate. CT as interpreted by radiologist showing appendicitis which patient elects [facility] for general surgery consultation with no specific surgeon preference. Patient was given flagyl and rocephin empirically as per Dr. John Douglass request and transfer arrangements made as discussed with Dr. Boo Knight at [facility] which he requests ER to ER transfer and will notify Dr. Jelly Vieira of acceptance.      Plan of Care/Counseling:  HERNESTO Van CNP and EM Attending Physician reviewed today's visit with the patient and mother in addition to providing specific details for the plan of care and counseling regarding the diagnosis and prognosis. Questions are answered at this time and are agreeable with the plan. ASSESSMENT     1. Acute appendicitis, unspecified acute appendicitis type      PLAN   Transferred to [facility] Emergency Department (ER-to-ER). Patient condition is stable    New Medications     New Prescriptions    No medications on file     Electronically signed by HERNESTO Holly CNP   DD: 5/31/22  **This report was transcribed using voice recognition software. Every effort was made to ensure accuracy; however, inadvertent computerized transcription errors may be present.   END OF ED PROVIDER NOTE       HERNESTO Holly CNP  05/31/22 2011

## 2022-06-01 NOTE — CARE COORDINATION
6/1/22 Transition of Care: Patient is observation and here due to need for appendectomy from home. He is alert and oriented. He is taking a diet. He will discharge home with his mother. He is officially discharge with no home going needs.  Electronically signed by Silvino Burgess RN CM on 6/1/2022 at 10:30 AM

## 2022-06-14 ENCOUNTER — OFFICE VISIT (OUTPATIENT)
Dept: SURGERY | Age: 22
End: 2022-06-14

## 2022-06-14 VITALS
SYSTOLIC BLOOD PRESSURE: 118 MMHG | WEIGHT: 170 LBS | HEIGHT: 69 IN | BODY MASS INDEX: 25.18 KG/M2 | DIASTOLIC BLOOD PRESSURE: 70 MMHG | TEMPERATURE: 98.3 F | RESPIRATION RATE: 16 BRPM

## 2022-06-14 DIAGNOSIS — Z90.49 S/P LAPAROSCOPIC APPENDECTOMY: Primary | ICD-10-CM

## 2022-06-14 PROCEDURE — 99024 POSTOP FOLLOW-UP VISIT: CPT | Performed by: SURGERY

## 2022-06-14 NOTE — LETTER
[address]  [address] [facility] [address]  Phone: [phone]  Fax: [phone]    Boo Knight MD        June 14, 2022     Patient: Lexi Porras   YOB: 2000   Date of Visit: 6/14/2022       To Whom It May Concern: It is my medical opinion that Nickolas Ricketts may return to full duty immediately with no restrictions. If you have any questions or concerns, please don't hesitate to call.     Sincerely,        Boo Knight MD

## 2022-09-02 PROCEDURE — 96375 TX/PRO/DX INJ NEW DRUG ADDON: CPT

## 2022-09-02 PROCEDURE — 96374 THER/PROPH/DIAG INJ IV PUSH: CPT

## 2022-09-02 PROCEDURE — 99285 EMERGENCY DEPT VISIT HI MDM: CPT

## 2022-09-02 PROCEDURE — 96361 HYDRATE IV INFUSION ADD-ON: CPT

## 2022-09-03 ENCOUNTER — HOSPITAL ENCOUNTER (EMERGENCY)
Age: 22
Discharge: HOME | End: 2022-09-03
Attending: EMERGENCY MEDICINE
Payer: COMMERCIAL

## 2022-09-03 ENCOUNTER — APPOINTMENT (OUTPATIENT)
Dept: CT IMAGING | Age: 22
End: 2022-09-03
Payer: COMMERCIAL

## 2022-09-03 VITALS
WEIGHT: 180 LBS | BODY MASS INDEX: 26.66 KG/M2 | HEIGHT: 69 IN | SYSTOLIC BLOOD PRESSURE: 104 MMHG | DIASTOLIC BLOOD PRESSURE: 47 MMHG | HEART RATE: 95 BPM | OXYGEN SATURATION: 96 % | RESPIRATION RATE: 16 BRPM

## 2022-09-03 DIAGNOSIS — K21.9 GASTROESOPHAGEAL REFLUX DISEASE WITHOUT ESOPHAGITIS: ICD-10-CM

## 2022-09-03 DIAGNOSIS — E86.0 DEHYDRATION, MODERATE: ICD-10-CM

## 2022-09-03 DIAGNOSIS — R11.2 NON-INTRACTABLE VOMITING WITH NAUSEA, UNSPECIFIED VOMITING TYPE: ICD-10-CM

## 2022-09-03 DIAGNOSIS — T67.5XXA HEAT EXHAUSTION, INITIAL ENCOUNTER: Primary | ICD-10-CM

## 2022-09-03 LAB
ALBUMIN SERPL-MCNC: 4.8 G/DL (ref 3.5–5.2)
ALP BLD-CCNC: 79 U/L (ref 40–129)
ALT SERPL-CCNC: 24 U/L (ref 0–40)
ANION GAP SERPL CALCULATED.3IONS-SCNC: 15 MMOL/L (ref 7–16)
AST SERPL-CCNC: 33 U/L (ref 0–39)
BASOPHILS ABSOLUTE: 0.03 E9/L (ref 0–0.2)
BASOPHILS RELATIVE PERCENT: 0.3 % (ref 0–2)
BILIRUB SERPL-MCNC: 0.6 MG/DL (ref 0–1.2)
BUN BLDV-MCNC: 15 MG/DL (ref 6–20)
CALCIUM SERPL-MCNC: 10.6 MG/DL (ref 8.6–10.2)
CHLORIDE BLD-SCNC: 104 MMOL/L (ref 98–107)
CO2: 21 MMOL/L (ref 22–29)
COHB: 3 % (ref 0–1.5)
COMMENT: ABNORMAL
CREAT SERPL-MCNC: 1.1 MG/DL (ref 0.7–1.2)
CRITICAL: ABNORMAL
DATE ANALYZED: ABNORMAL
DATE OF COLLECTION: ABNORMAL
EOSINOPHILS ABSOLUTE: 0.1 E9/L (ref 0.05–0.5)
EOSINOPHILS RELATIVE PERCENT: 0.8 % (ref 0–6)
GFR AFRICAN AMERICAN: >60
GFR NON-AFRICAN AMERICAN: >60 ML/MIN/1.73
GLUCOSE BLD-MCNC: 92 MG/DL (ref 74–99)
HCT VFR BLD CALC: 42.3 % (ref 37–54)
HEMOGLOBIN: 14.7 G/DL (ref 12.5–16.5)
HHB: 1.6 %
IMMATURE GRANULOCYTES #: 0.05 E9/L
IMMATURE GRANULOCYTES %: 0.4 % (ref 0–5)
LAB: ABNORMAL
LACTIC ACID: 0.7 MMOL/L (ref 0.5–2.2)
LIPASE: 27 U/L (ref 13–60)
LYMPHOCYTES ABSOLUTE: 2.42 E9/L (ref 1.5–4)
LYMPHOCYTES RELATIVE PERCENT: 20.3 % (ref 20–42)
Lab: ABNORMAL
MCH RBC QN AUTO: 29.2 PG (ref 26–35)
MCHC RBC AUTO-ENTMCNC: 34.8 % (ref 32–34.5)
MCV RBC AUTO: 83.9 FL (ref 80–99.9)
METHB: 0.3 % (ref 0–1.5)
MONOCYTES ABSOLUTE: 1.05 E9/L (ref 0.1–0.95)
MONOCYTES RELATIVE PERCENT: 8.8 % (ref 2–12)
NEUTROPHILS ABSOLUTE: 8.27 E9/L (ref 1.8–7.3)
NEUTROPHILS RELATIVE PERCENT: 69.4 % (ref 43–80)
O2 SATURATION: 98.3 %
O2HB: 95.1 %
OPERATOR ID: 2577
PATIENT TEMP: 37 C
PDW BLD-RTO: 13.3 FL (ref 11.5–15)
PLATELET # BLD: 295 E9/L (ref 130–450)
PMV BLD AUTO: 10.2 FL (ref 7–12)
POTASSIUM SERPL-SCNC: 4 MMOL/L (ref 3.5–5)
RBC # BLD: 5.04 E12/L (ref 3.8–5.8)
SODIUM BLD-SCNC: 140 MMOL/L (ref 132–146)
SOURCE, BLOOD GAS: ABNORMAL
THB: 15.3 G/DL (ref 11.5–16.5)
TIME ANALYZED: 220
TOTAL PROTEIN: 7.9 G/DL (ref 6.4–8.3)
WBC # BLD: 11.9 E9/L (ref 4.5–11.5)

## 2022-09-03 PROCEDURE — 82375 ASSAY CARBOXYHB QUANT: CPT

## 2022-09-03 PROCEDURE — 2580000003 HC RX 258: Performed by: EMERGENCY MEDICINE

## 2022-09-03 PROCEDURE — 74177 CT ABD & PELVIS W/CONTRAST: CPT

## 2022-09-03 PROCEDURE — 6360000004 HC RX CONTRAST MEDICATION: Performed by: RADIOLOGY

## 2022-09-03 PROCEDURE — 93005 ELECTROCARDIOGRAM TRACING: CPT | Performed by: EMERGENCY MEDICINE

## 2022-09-03 PROCEDURE — 6370000000 HC RX 637 (ALT 250 FOR IP): Performed by: EMERGENCY MEDICINE

## 2022-09-03 PROCEDURE — 6360000002 HC RX W HCPCS: Performed by: EMERGENCY MEDICINE

## 2022-09-03 RX ORDER — OMEPRAZOLE 20 MG/1
20 CAPSULE, DELAYED RELEASE ORAL
Qty: 10 CAPSULE | Refills: 1 | Status: SHIPPED | OUTPATIENT
Start: 2022-09-03

## 2022-09-03 RX ORDER — ONDANSETRON 4 MG/1
8 TABLET, ORALLY DISINTEGRATING ORAL EVERY 8 HOURS PRN
Qty: 20 TABLET | Refills: 0 | Status: SHIPPED | OUTPATIENT
Start: 2022-09-03

## 2022-09-03 RX ORDER — METOCLOPRAMIDE HYDROCHLORIDE 5 MG/ML
10 INJECTION INTRAMUSCULAR; INTRAVENOUS ONCE
Status: COMPLETED | OUTPATIENT
Start: 2022-09-03 | End: 2022-09-03

## 2022-09-03 RX ORDER — DIPHENHYDRAMINE HYDROCHLORIDE 50 MG/ML
50 INJECTION INTRAMUSCULAR; INTRAVENOUS ONCE
Status: COMPLETED | OUTPATIENT
Start: 2022-09-03 | End: 2022-09-03

## 2022-09-03 RX ORDER — 0.9 % SODIUM CHLORIDE 0.9 %
2000 INTRAVENOUS SOLUTION INTRAVENOUS ONCE
Status: COMPLETED | OUTPATIENT
Start: 2022-09-03 | End: 2022-09-03

## 2022-09-03 RX ORDER — LORAZEPAM 2 MG/ML
1 INJECTION INTRAMUSCULAR ONCE
Status: COMPLETED | OUTPATIENT
Start: 2022-09-03 | End: 2022-09-03

## 2022-09-03 RX ADMIN — Medication: at 02:19

## 2022-09-03 RX ADMIN — LORAZEPAM 1 MG: 2 INJECTION INTRAMUSCULAR; INTRAVENOUS at 01:38

## 2022-09-03 RX ADMIN — IOPAMIDOL 50 ML: 755 INJECTION, SOLUTION INTRAVENOUS at 02:54

## 2022-09-03 RX ADMIN — DIPHENHYDRAMINE HYDROCHLORIDE 50 MG: 50 INJECTION INTRAMUSCULAR; INTRAVENOUS at 01:38

## 2022-09-03 RX ADMIN — METOCLOPRAMIDE 10 MG: 5 INJECTION, SOLUTION INTRAMUSCULAR; INTRAVENOUS at 01:38

## 2022-09-03 RX ADMIN — SODIUM CHLORIDE 2000 ML: 9 INJECTION, SOLUTION INTRAVENOUS at 01:54

## 2022-09-03 ASSESSMENT — PAIN SCALES - GENERAL: PAINLEVEL_OUTOF10: 7

## 2022-09-03 ASSESSMENT — PAIN DESCRIPTION - LOCATION: LOCATION: CHEST

## 2022-09-03 ASSESSMENT — PAIN DESCRIPTION - DESCRIPTORS: DESCRIPTORS: PRESSURE

## 2022-09-03 ASSESSMENT — PAIN DESCRIPTION - PAIN TYPE: TYPE: ACUTE PAIN

## 2022-09-03 ASSESSMENT — PAIN - FUNCTIONAL ASSESSMENT: PAIN_FUNCTIONAL_ASSESSMENT: 0-10

## 2022-09-03 NOTE — Clinical Note
Christiano Vazquez was seen and treated in our emergency department on 9/2/2022. He may return to work on 09/07/2022. If you have any questions or concerns, please don't hesitate to call.       Yoel Tenorio MD
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
## 2022-09-06 LAB
EKG ATRIAL RATE: 67 BPM
EKG P AXIS: 42 DEGREES
EKG P-R INTERVAL: 140 MS
EKG Q-T INTERVAL: 388 MS
EKG QRS DURATION: 88 MS
EKG QTC CALCULATION (BAZETT): 409 MS
EKG R AXIS: 59 DEGREES
EKG T AXIS: 55 DEGREES
EKG VENTRICULAR RATE: 67 BPM

## 2022-09-15 LAB
Lab: NORMAL
REPORT: NORMAL
THIS TEST SENT TO: NORMAL

## 2022-12-16 ENCOUNTER — OFFICE VISIT (OUTPATIENT)
Dept: FAMILY MEDICINE CLINIC | Age: 22
End: 2022-12-16
Payer: COMMERCIAL

## 2022-12-16 VITALS
BODY MASS INDEX: 25.39 KG/M2 | HEIGHT: 69 IN | WEIGHT: 171.4 LBS | SYSTOLIC BLOOD PRESSURE: 102 MMHG | HEART RATE: 71 BPM | RESPIRATION RATE: 18 BRPM | OXYGEN SATURATION: 97 % | TEMPERATURE: 97.7 F | DIASTOLIC BLOOD PRESSURE: 68 MMHG

## 2022-12-16 DIAGNOSIS — B34.9 VIRAL ILLNESS: Primary | ICD-10-CM

## 2022-12-16 DIAGNOSIS — R68.89 FLU-LIKE SYMPTOMS: ICD-10-CM

## 2022-12-16 DIAGNOSIS — R51.9 ACUTE INTRACTABLE HEADACHE, UNSPECIFIED HEADACHE TYPE: ICD-10-CM

## 2022-12-16 PROCEDURE — 99213 OFFICE O/P EST LOW 20 MIN: CPT

## 2022-12-16 PROCEDURE — 96372 THER/PROPH/DIAG INJ SC/IM: CPT

## 2022-12-16 RX ORDER — DEXAMETHASONE SODIUM PHOSPHATE 10 MG/ML
10 INJECTION INTRAMUSCULAR; INTRAVENOUS ONCE
Status: COMPLETED | OUTPATIENT
Start: 2022-12-16 | End: 2022-12-16

## 2022-12-16 RX ADMIN — DEXAMETHASONE SODIUM PHOSPHATE 10 MG: 10 INJECTION INTRAMUSCULAR; INTRAVENOUS at 15:18

## 2022-12-16 NOTE — LETTER
[facility]  [address] [place] [address]  [place] [address]  Phone: [phone]  Fax: [phone]    Susan Doll, [address]        December 16, 2022     Patient: Ryan Chin   YOB: 2000   Date of Visit: 12/16/2022       To Whom It May Concern: It is my medical opinion that Parvin Hall may return to work on 12/19. If you have any questions or concerns, please don't hesitate to call.     Sincerely,        DAVID Oates

## 2022-12-16 NOTE — PROGRESS NOTES
Chief Complaint   Neck Pain (Pains in back of neck, shoulders and upper back started almost 1 week ago), Abdominal Pain (Started almost 1 week ago. Feeling cramping. No specific cause. No eating issues), and Diarrhea    History of Present Illness   Source of history provided by:  patient. Gera Jones is a 25 y.o. old male presenting to the walk in clinic with for evaluation of a nonproductive cough, headache, body aches, chills, mild nausea, diarrhea, and nasal congestion/drainage. States symptoms have been present x 6 days. States symptoms have improved since onset. Denies any CP, SOB, abdominal pain, diarrhea, rash, or lethargy. Has been taking Advil without symptomatic relief. Denies any hx of asthma, COPD, or tobacco use. Denies any contact with any individuals with known COVID-19 infection or under investigation for COVID-19 infection. ROS   Pertinent positives and negatives are stated within HPI, all other systems reviewed and are negative. Physical Exam      VS:  /68 (Site: Right Upper Arm, Position: Sitting, Cuff Size: Medium Adult)   Pulse 71   Temp 97.7 °F (36.5 °C) (Temporal)   Resp 18   Ht 5' 9\" (1.753 m)   Wt 171 lb 6.4 oz (77.7 kg)   SpO2 97%   BMI 25.31 kg/m²    Oxygen Saturation Interpretation: Normal.    Constitutional:  Alert, development consistent with age. NAD. Head:  NC/NT. Airway patent. Ears: TMs translucent bilaterally. Canals without exudate or swelling bilaterally. Nose: Mild congestion of the nasal mucosa with clear drainage. Mouth: Posterior pharynx with mild erythema and clear postnasal drip. No tonsillar hypertrophy or exudate. Neck:  Normal ROM. Supple. No anterior cervical adenopathy noted. Lungs: CTAB without wheezes, rales, or rhonchi. Abdomen: Soft, non-tender, non-distended. NABS x 4.  CV:  Regular rate and rhythm, normal heart sounds, without pathological murmurs, ectopy, gallops, or rubs. Skin:  Normal turgor.   Warm, dry, without visible rash. Lymphatic: No lymphangitis or adenopathy noted. Neurological:  Oriented. Motor functions intact. Lab / Imaging Results   (All laboratory and radiology results have been personally reviewed by myself)  Labs:  No results found for this visit on 12/16/22. Imaging: All Radiology results interpreted by Radiologist unless otherwise noted. Assessment / Plan     Impression(s):  Radha Sawyer was seen today for neck pain, abdominal pain and diarrhea. Diagnoses and all orders for this visit:    Viral illness  -     dexamethasone (DECADRON) injection 10 mg    Flu-like symptoms    Acute intractable headache, unspecified headache type  -     dexamethasone (DECADRON) injection 10 mg    Disposition:  Disposition: Discharge to home. Decadron injection given in office, side effects discussed. Increase fluids and rest. NSAIDs prn body aches/fever. PCP in 3-5 days for recheck if symptoms persist. ED sooner if symptoms worsen or change. ED immediately with high or refractory fever, dyspnea, CP, shaking chills, vomiting, abdominal pain, lethargy, etc. Pt states understanding and is in agreement with this care plan. All questions answered. DAVID Turner    **This report was transcribed using voice recognition software. Every effort was made to ensure accuracy; however, inadvertent computerized transcription errors may be present.

## 2022-12-20 ENCOUNTER — APPOINTMENT (OUTPATIENT)
Dept: CT IMAGING | Age: 22
DRG: 813 | End: 2022-12-20
Payer: COMMERCIAL

## 2022-12-20 ENCOUNTER — HOSPITAL ENCOUNTER (INPATIENT)
Age: 22
LOS: 1 days | Discharge: HOME | DRG: 813 | End: 2022-12-22
Attending: EMERGENCY MEDICINE | Admitting: INTERNAL MEDICINE
Payer: COMMERCIAL

## 2022-12-20 ENCOUNTER — APPOINTMENT (OUTPATIENT)
Dept: GENERAL RADIOLOGY | Age: 22
DRG: 813 | End: 2022-12-20
Payer: COMMERCIAL

## 2022-12-20 DIAGNOSIS — D69.6 THROMBOCYTOPENIA (HCC): Primary | ICD-10-CM

## 2022-12-20 DIAGNOSIS — B27.99 INFECTIOUS MONONUCLEOSIS, WITH OTHER COMPLICATION, INFECTIOUS MONONUCLEOSIS DUE TO UNSPECIFIED ORGANISM: ICD-10-CM

## 2022-12-20 LAB
ALBUMIN SERPL-MCNC: 4.8 G/DL (ref 3.5–5.2)
ALP BLD-CCNC: 64 U/L (ref 40–129)
ALT SERPL-CCNC: 26 U/L (ref 0–40)
ANION GAP SERPL CALCULATED.3IONS-SCNC: 12 MMOL/L (ref 7–16)
AST SERPL-CCNC: 24 U/L (ref 0–39)
BASOPHILS ABSOLUTE: 0.03 E9/L (ref 0–0.2)
BASOPHILS RELATIVE PERCENT: 0.3 % (ref 0–2)
BILIRUB SERPL-MCNC: 0.5 MG/DL (ref 0–1.2)
BUN BLDV-MCNC: 14 MG/DL (ref 6–20)
CALCIUM SERPL-MCNC: 9.8 MG/DL (ref 8.6–10.2)
CHLORIDE BLD-SCNC: 100 MMOL/L (ref 98–107)
CO2: 27 MMOL/L (ref 22–29)
CREAT SERPL-MCNC: 1.1 MG/DL (ref 0.7–1.2)
EOSINOPHILS ABSOLUTE: 0.03 E9/L (ref 0.05–0.5)
EOSINOPHILS RELATIVE PERCENT: 0.3 % (ref 0–6)
FIBRINOGEN: 176 MG/DL (ref 200–400)
GFR SERPL CREATININE-BSD FRML MDRD: >60 ML/MIN/1.73
GLUCOSE BLD-MCNC: 95 MG/DL (ref 74–99)
HCT VFR BLD CALC: 42.9 % (ref 37–54)
HEMOGLOBIN: 14.7 G/DL (ref 12.5–16.5)
IMMATURE GRANULOCYTES #: 0.03 E9/L
IMMATURE GRANULOCYTES %: 0.3 % (ref 0–5)
INR BLD: 1.1
LYMPHOCYTES ABSOLUTE: 1.65 E9/L (ref 1.5–4)
LYMPHOCYTES RELATIVE PERCENT: 16.5 % (ref 20–42)
MCH RBC QN AUTO: 27.9 PG (ref 26–35)
MCHC RBC AUTO-ENTMCNC: 34.3 % (ref 32–34.5)
MCV RBC AUTO: 81.4 FL (ref 80–99.9)
MONO TEST: POSITIVE
MONOCYTES ABSOLUTE: 0.78 E9/L (ref 0.1–0.95)
MONOCYTES RELATIVE PERCENT: 7.8 % (ref 2–12)
NEUTROPHILS ABSOLUTE: 7.47 E9/L (ref 1.8–7.3)
NEUTROPHILS RELATIVE PERCENT: 74.8 % (ref 43–80)
PDW BLD-RTO: 12.5 FL (ref 11.5–15)
PLATELET # BLD: 15 E9/L (ref 130–450)
PLATELET CONFIRMATION: NORMAL
PMV BLD AUTO: ABNORMAL FL (ref 7–12)
POTASSIUM SERPL-SCNC: 3.5 MMOL/L (ref 3.5–5)
PROTHROMBIN TIME: 12.1 SEC (ref 9.3–12.4)
RBC # BLD: 5.27 E12/L (ref 3.8–5.8)
RBC # BLD: NORMAL 10*6/UL
SODIUM BLD-SCNC: 139 MMOL/L (ref 132–146)
TOTAL PROTEIN: 7.3 G/DL (ref 6.4–8.3)
WBC # BLD: 10 E9/L (ref 4.5–11.5)

## 2022-12-20 PROCEDURE — 71045 X-RAY EXAM CHEST 1 VIEW: CPT

## 2022-12-20 PROCEDURE — 99285 EMERGENCY DEPT VISIT HI MDM: CPT

## 2022-12-20 PROCEDURE — 85384 FIBRINOGEN ACTIVITY: CPT

## 2022-12-20 PROCEDURE — 2580000003 HC RX 258: Performed by: RADIOLOGY

## 2022-12-20 PROCEDURE — 93005 ELECTROCARDIOGRAM TRACING: CPT | Performed by: EMERGENCY MEDICINE

## 2022-12-20 PROCEDURE — 85025 COMPLETE CBC W/AUTO DIFF WBC: CPT

## 2022-12-20 PROCEDURE — 36415 COLL VENOUS BLD VENIPUNCTURE: CPT

## 2022-12-20 PROCEDURE — 80053 COMPREHEN METABOLIC PANEL: CPT

## 2022-12-20 PROCEDURE — 74177 CT ABD & PELVIS W/CONTRAST: CPT

## 2022-12-20 PROCEDURE — 86665 EPSTEIN-BARR CAPSID VCA: CPT

## 2022-12-20 PROCEDURE — 6360000004 HC RX CONTRAST MEDICATION: Performed by: RADIOLOGY

## 2022-12-20 PROCEDURE — 86308 HETEROPHILE ANTIBODY SCREEN: CPT

## 2022-12-20 PROCEDURE — 85610 PROTHROMBIN TIME: CPT

## 2022-12-20 RX ORDER — SODIUM CHLORIDE 0.9 % (FLUSH) 0.9 %
10 SYRINGE (ML) INJECTION ONCE
Status: COMPLETED | OUTPATIENT
Start: 2022-12-20 | End: 2022-12-20

## 2022-12-20 RX ADMIN — IOPAMIDOL 75 ML: 755 INJECTION, SOLUTION INTRAVENOUS at 18:01

## 2022-12-20 RX ADMIN — SODIUM CHLORIDE, PRESERVATIVE FREE 10 ML: 5 INJECTION INTRAVENOUS at 18:01

## 2022-12-20 ASSESSMENT — ENCOUNTER SYMPTOMS
SORE THROAT: 0
BACK PAIN: 0
EYE PAIN: 0
NAUSEA: 0
ABDOMINAL PAIN: 0
SHORTNESS OF BREATH: 0
VOMITING: 0
DIARRHEA: 0

## 2022-12-20 ASSESSMENT — PAIN - FUNCTIONAL ASSESSMENT: PAIN_FUNCTIONAL_ASSESSMENT: NONE - DENIES PAIN

## 2022-12-20 ASSESSMENT — LIFESTYLE VARIABLES: HOW OFTEN DO YOU HAVE A DRINK CONTAINING ALCOHOL: 2-4 TIMES A MONTH

## 2022-12-20 NOTE — ED PROVIDER NOTES
Chief Complaint   Patient presents with    Other     Patient sent in for low platelets. CLOTILDE Moeller Seen is a 25 y.o. old male presenting to the emergency department for abnormal lab results including low platelets. Patient reports he has been feeling generalized fatigue for the past year, possibly related to after being exposed to COVID-19. Over the past 2 to 3 weeks he has noted increase bruising. He was sick with a URI last week which has since improved. He followed up with his primary care provider for a blood work draw yesterday that was found to be abnormal today. His primary care provider recommended he be evaluated in this emergency department today. Patient states his fatigue is intermittent, moderate in severity, with no relieving or provoking factors. He states he has several bruises across his extremities and trunk in various stages of healing. He reports being exposed to a girlfriend who tested positive for mono back in September. Patient denies any fevers, chills, chest pain, abdominal pain, hematuria, or dysuria at this time. Review of Systems   Constitutional:  Positive for fatigue. Negative for chills and fever. HENT:  Negative for ear pain and sore throat. Eyes:  Negative for pain. Respiratory:  Negative for shortness of breath. Cardiovascular:  Negative for chest pain. Gastrointestinal:  Negative for abdominal pain, diarrhea, nausea and vomiting. Genitourinary:  Negative for flank pain. Musculoskeletal:  Negative for back pain and neck pain. Skin:  Negative for rash. Neurological:  Negative for headaches. Hematological:  Bruises/bleeds easily. Psychiatric/Behavioral:  Negative for behavioral problems. The patient is not nervous/anxious. Physical Exam  Constitutional:       General: He is not in acute distress. Appearance: Normal appearance. HENT:      Head: Normocephalic and atraumatic.       Right Ear: External ear normal.      Left Ear: External ear normal.      Nose: Nose normal.      Mouth/Throat:      Mouth: Mucous membranes are moist.      Pharynx: No oropharyngeal exudate. Eyes:      Extraocular Movements: Extraocular movements intact. Conjunctiva/sclera: Conjunctivae normal.      Pupils: Pupils are equal, round, and reactive to light. Cardiovascular:      Rate and Rhythm: Normal rate and regular rhythm. Pulses: Normal pulses. Heart sounds: Normal heart sounds. Pulmonary:      Effort: Pulmonary effort is normal. No respiratory distress. Breath sounds: Normal breath sounds. Abdominal:      Tenderness: There is no abdominal tenderness. There is no guarding or rebound. Musculoskeletal:         General: No deformity or signs of injury. Cervical back: Normal range of motion and neck supple. Lymphadenopathy:      Cervical: No cervical adenopathy. Skin:     General: Skin is warm and dry. Capillary Refill: Capillary refill takes less than 2 seconds. Findings: Bruising (Multiple bruises of varying stages of healing across the upper, lower extremities, and abdomen) present. Comments: No visible petechiae. Neurological:      General: No focal deficit present. Mental Status: He is alert. Mental status is at baseline. Psychiatric:         Mood and Affect: Mood normal.        Procedures     MDM     Amount and/or Complexity of Data Reviewed  Clinical lab tests: reviewed  Decide to obtain previous medical records or to obtain history from someone other than the patient: yes       --------------------------------------------- PAST HISTORY ---------------------------------------------  Past Medical History:  has a past medical history of Asthma. Past Surgical History:  has a past surgical history that includes laparoscopic appendectomy (N/A, 6/1/2022). Social History:  reports that he has quit smoking. He has never used smokeless tobacco. He reports current alcohol use.  He reports that he does not use drugs. Family History: family history includes No Known Problems in his father and mother. The patients home medications have been reviewed. Allergies: Patient has no known allergies. -------------------------------------------------- RESULTS -------------------------------------------------  I have personally reviewed all laboratory and imaging results for this patient. Results are listed below.      LABS:  Results for orders placed or performed during the hospital encounter of 12/20/22   Mononucleosis screen   Result Value Ref Range    Mono Test POSITIVE (A) Negative   CBC with Auto Differential   Result Value Ref Range    WBC 10.0 4.5 - 11.5 E9/L    RBC 5.27 3.80 - 5.80 E12/L    Hemoglobin 14.7 12.5 - 16.5 g/dL    Hematocrit 42.9 37.0 - 54.0 %    MCV 81.4 80.0 - 99.9 fL    MCH 27.9 26.0 - 35.0 pg    MCHC 34.3 32.0 - 34.5 %    RDW 12.5 11.5 - 15.0 fL    Platelets 15 (LL) 967 - 450 E9/L    MPV NOT CALC 7.0 - 12.0 fL    Neutrophils % 74.8 43.0 - 80.0 %    Immature Granulocytes % 0.3 0.0 - 5.0 %    Lymphocytes % 16.5 (L) 20.0 - 42.0 %    Monocytes % 7.8 2.0 - 12.0 %    Eosinophils % 0.3 0.0 - 6.0 %    Basophils % 0.3 0.0 - 2.0 %    Neutrophils Absolute 7.47 (H) 1.80 - 7.30 E9/L    Immature Granulocytes # 0.03 E9/L    Lymphocytes Absolute 1.65 1.50 - 4.00 E9/L    Monocytes Absolute 0.78 0.10 - 0.95 E9/L    Eosinophils Absolute 0.03 (L) 0.05 - 0.50 E9/L    Basophils Absolute 0.03 0.00 - 0.20 E9/L    RBC Morphology Normal    CMP   Result Value Ref Range    Sodium 139 132 - 146 mmol/L    Potassium 3.5 3.5 - 5.0 mmol/L    Chloride 100 98 - 107 mmol/L    CO2 27 22 - 29 mmol/L    Anion Gap 12 7 - 16 mmol/L    Glucose 95 74 - 99 mg/dL    BUN 14 6 - 20 mg/dL    Creatinine 1.1 0.7 - 1.2 mg/dL    Est, Glom Filt Rate >60 >=60 mL/min/1.73    Calcium 9.8 8.6 - 10.2 mg/dL    Total Protein 7.3 6.4 - 8.3 g/dL    Albumin 4.8 3.5 - 5.2 g/dL    Total Bilirubin 0.5 0.0 - 1.2 mg/dL    Alkaline Phosphatase 64 40 - 129 U/L    ALT 26 0 - 40 U/L    AST 24 0 - 39 U/L   Protime-INR   Result Value Ref Range    Protime 12.1 9.3 - 12.4 sec    INR 1.1    Fibrinogen   Result Value Ref Range    Fibrinogen 176 (L) 200 - 400 mg/dL   Platelet Confirmation   Result Value Ref Range    Platelet Confirmation CONFIRMED    EKG 12 Lead   Result Value Ref Range    Ventricular Rate 68 BPM    Atrial Rate 68 BPM    P-R Interval 142 ms    QRS Duration 86 ms    Q-T Interval 398 ms    QTc Calculation (Bazett) 423 ms    P Axis 34 degrees    R Axis 46 degrees    T Axis 34 degrees       RADIOLOGY:  Interpreted by Radiologist.  CT ABDOMEN PELVIS W IV CONTRAST Additional Contrast? None   Final Result   No acute process in the abdomen and pelvis. XR CHEST PORTABLE   Final Result   No acute process. EKG Interpretation  Time: 17:04 PM EDT  Rhythm: normal sinus   Rate: 68  Axis: normal  Conduction: normal  ST Segments: no acute change  T Waves: no acute change  Clinical Impression: no acute changes  Comparison to prior EKG: Stable as compares to previous      ------------------------- NURSING NOTES AND VITALS REVIEWED ---------------------------  The nursing notes within the ED encounter and vital signs as below have been reviewed by myself. /72   Pulse 90   Temp 97.3 °F (36.3 °C) (Tympanic)   Resp 18   SpO2 98%   Oxygen Saturation Interpretation: Normal      The patients available past medical records and past encounters were reviewed.         ------------------------------ ED COURSE/MEDICAL DECISION MAKING----------------------  Medications   FLUoxetine (PROZAC) tablet 10 mg (has no administration in time range)   Lisdexamfetamine Dimesylate (VYVANSE) capsule 20 mg (Patient Supplied) (has no administration in time range)   0.9 % sodium chloride infusion ( IntraVENous New Bag 12/21/22 0139)   sodium chloride flush 0.9 % injection 5-40 mL (has no administration in time range)   sodium chloride flush 0.9 % injection 10 mL (has no administration in time range)   0.9 % sodium chloride infusion (has no administration in time range)   ondansetron (ZOFRAN-ODT) disintegrating tablet 4 mg (has no administration in time range)     Or   ondansetron (ZOFRAN) injection 4 mg (has no administration in time range)   polyethylene glycol (GLYCOLAX) packet 17 g (has no administration in time range)   acetaminophen (TYLENOL) tablet 650 mg (has no administration in time range)     Or   acetaminophen (TYLENOL) suppository 650 mg (has no administration in time range)   potassium chloride (KLOR-CON M) extended release tablet 40 mEq (has no administration in time range)     Or   potassium bicarb-citric acid (EFFER-K) effervescent tablet 40 mEq (has no administration in time range)     Or   potassium chloride 10 mEq/100 mL IVPB (Peripheral Line) (has no administration in time range)   albuterol (PROVENTIL) nebulizer solution 2.5 mg (has no administration in time range)   iopamidol (ISOVUE-370) 76 % injection 75 mL (75 mLs IntraVENous Given 12/20/22 1801)   sodium chloride flush 0.9 % injection 10 mL (10 mLs IntraVENous Given 12/20/22 1801)           Procedures:   none      Medical Decision Making:    Low platelets - cause could be mono vs other. Platelet count yesterday was 19 and is now 15. Most recent platelet count in September of this year, 3 months ago was normal at 295. Patient does have bruising, in particular a bruise on his arm from the Decadron shot 5 days ago. His Monospot here today is positive. He exhibits no gum bleeding, no rectal bleeding. Given his sudden drop in platelets and the extent of his drop in platelets and concern for potential bleeding will admit for further investigation, potential transfusion if he develops bleeding or further drop to a transfuse a blood level, potential infectious disease and hematology consult.     This patient's ED course included: re-evaluation prior to disposition, cardiac monitoring, continuous pulse oximetry, and a personal history and physicial eaxmination    This patient has remained hemodynamically stable and remained unchanged during their ED course. Re-Evaluations:  Time: 4:55 PM EST   Re-evaluation. Patients symptoms show no change  Repeat physical examination is not changed      Consultations:   16:34 PM EDT Spoke with Risa Hood hospitalist, discussed case, accepts patient to Dr Alysia Falcon service at [address] [place] [address] Street: none    I, Leslie Hackett, DO, am the Primary Provider of Record    Counseling: The emergency provider has spoken with the patient and parents and discussed todays results, in addition to providing specific details for the plan of care and counseling regarding the diagnosis and prognosis. Questions are answered at this time and they are agreeable with the plan.    --------------------------- IMPRESSION AND DISPOSITION ---------------------------------    IMPRESSION  1. Thrombocytopenia ([address]    2.  Infectious mononucleosis, with other complication, infectious mononucleosis due to unspecified organism        DISPOSITION  Disposition: Admit to med/surg with tele floor  Patient condition is stable               Dante Rao DO  12/21/22 5919

## 2022-12-21 PROBLEM — D69.6 THROMBOCYTOPENIA (HCC): Status: ACTIVE | Noted: 2022-12-21

## 2022-12-21 LAB
ANION GAP SERPL CALCULATED.3IONS-SCNC: 10 MMOL/L (ref 7–16)
BASOPHILS ABSOLUTE: 0.03 E9/L (ref 0–0.2)
BASOPHILS RELATIVE PERCENT: 0.4 % (ref 0–2)
BUN BLDV-MCNC: 12 MG/DL (ref 6–20)
CALCIUM SERPL-MCNC: 9.9 MG/DL (ref 8.6–10.2)
CHLORIDE BLD-SCNC: 104 MMOL/L (ref 98–107)
CO2: 27 MMOL/L (ref 22–29)
CREAT SERPL-MCNC: 1.1 MG/DL (ref 0.7–1.2)
EKG ATRIAL RATE: 68 BPM
EKG P AXIS: 34 DEGREES
EKG P-R INTERVAL: 142 MS
EKG Q-T INTERVAL: 398 MS
EKG QRS DURATION: 86 MS
EKG QTC CALCULATION (BAZETT): 423 MS
EKG R AXIS: 46 DEGREES
EKG T AXIS: 34 DEGREES
EKG VENTRICULAR RATE: 68 BPM
EOSINOPHILS ABSOLUTE: 0.1 E9/L (ref 0.05–0.5)
EOSINOPHILS RELATIVE PERCENT: 1.4 % (ref 0–6)
GFR SERPL CREATININE-BSD FRML MDRD: >60 ML/MIN/1.73
GLUCOSE BLD-MCNC: 80 MG/DL (ref 74–99)
HCT VFR BLD CALC: 44.1 % (ref 37–54)
HEMOGLOBIN: 15 G/DL (ref 12.5–16.5)
IMMATURE GRANULOCYTES #: 0.02 E9/L
IMMATURE GRANULOCYTES %: 0.3 % (ref 0–5)
LYMPHOCYTES ABSOLUTE: 1.93 E9/L (ref 1.5–4)
LYMPHOCYTES RELATIVE PERCENT: 27.7 % (ref 20–42)
MCH RBC QN AUTO: 28.6 PG (ref 26–35)
MCHC RBC AUTO-ENTMCNC: 34 % (ref 32–34.5)
MCV RBC AUTO: 84.2 FL (ref 80–99.9)
MONOCYTES ABSOLUTE: 0.53 E9/L (ref 0.1–0.95)
MONOCYTES RELATIVE PERCENT: 7.6 % (ref 2–12)
NEUTROPHILS ABSOLUTE: 4.37 E9/L (ref 1.8–7.3)
NEUTROPHILS RELATIVE PERCENT: 62.6 % (ref 43–80)
PATHOLOGIST REVIEW: NORMAL
PDW BLD-RTO: 12.2 FL (ref 11.5–15)
PLATELET # BLD: 14 E9/L (ref 130–450)
PLATELET CONFIRMATION: NORMAL
PMV BLD AUTO: 11.4 FL (ref 7–12)
POTASSIUM REFLEX MAGNESIUM: 4.1 MMOL/L (ref 3.5–5)
RBC # BLD: 5.24 E12/L (ref 3.8–5.8)
SODIUM BLD-SCNC: 141 MMOL/L (ref 132–146)
WBC # BLD: 7 E9/L (ref 4.5–11.5)

## 2022-12-21 PROCEDURE — 6370000000 HC RX 637 (ALT 250 FOR IP): Performed by: FAMILY MEDICINE

## 2022-12-21 PROCEDURE — 2060000000 HC ICU INTERMEDIATE R&B

## 2022-12-21 PROCEDURE — 80048 BASIC METABOLIC PNL TOTAL CA: CPT

## 2022-12-21 PROCEDURE — 6360000002 HC RX W HCPCS: Performed by: INTERNAL MEDICINE

## 2022-12-21 PROCEDURE — 36415 COLL VENOUS BLD VENIPUNCTURE: CPT

## 2022-12-21 PROCEDURE — 2580000003 HC RX 258: Performed by: FAMILY MEDICINE

## 2022-12-21 PROCEDURE — 85025 COMPLETE CBC W/AUTO DIFF WBC: CPT

## 2022-12-21 RX ORDER — POLYETHYLENE GLYCOL 3350 17 G/17G
17 POWDER, FOR SOLUTION ORAL DAILY PRN
Status: DISCONTINUED | OUTPATIENT
Start: 2022-12-21 | End: 2022-12-22 | Stop reason: HOSPADM

## 2022-12-21 RX ORDER — ALBUTEROL SULFATE 2.5 MG/3ML
2.5 SOLUTION RESPIRATORY (INHALATION) EVERY 6 HOURS PRN
Status: DISCONTINUED | OUTPATIENT
Start: 2022-12-21 | End: 2022-12-22 | Stop reason: HOSPADM

## 2022-12-21 RX ORDER — SODIUM CHLORIDE 9 MG/ML
INJECTION, SOLUTION INTRAVENOUS CONTINUOUS
Status: DISCONTINUED | OUTPATIENT
Start: 2022-12-21 | End: 2022-12-21

## 2022-12-21 RX ORDER — POTASSIUM CHLORIDE 7.45 MG/ML
10 INJECTION INTRAVENOUS PRN
Status: DISCONTINUED | OUTPATIENT
Start: 2022-12-21 | End: 2022-12-22 | Stop reason: HOSPADM

## 2022-12-21 RX ORDER — DEXAMETHASONE 4 MG/1
40 TABLET ORAL DAILY
Status: DISCONTINUED | OUTPATIENT
Start: 2022-12-21 | End: 2022-12-22 | Stop reason: HOSPADM

## 2022-12-21 RX ORDER — ONDANSETRON 2 MG/ML
4 INJECTION INTRAMUSCULAR; INTRAVENOUS EVERY 6 HOURS PRN
Status: DISCONTINUED | OUTPATIENT
Start: 2022-12-21 | End: 2022-12-22 | Stop reason: HOSPADM

## 2022-12-21 RX ORDER — ONDANSETRON 4 MG/1
4 TABLET, ORALLY DISINTEGRATING ORAL EVERY 8 HOURS PRN
Status: DISCONTINUED | OUTPATIENT
Start: 2022-12-21 | End: 2022-12-22 | Stop reason: HOSPADM

## 2022-12-21 RX ORDER — SODIUM CHLORIDE 0.9 % (FLUSH) 0.9 %
10 SYRINGE (ML) INJECTION PRN
Status: DISCONTINUED | OUTPATIENT
Start: 2022-12-21 | End: 2022-12-22 | Stop reason: HOSPADM

## 2022-12-21 RX ORDER — MECOBALAMIN 5000 MCG
5 TABLET,DISINTEGRATING ORAL
Status: DISCONTINUED | OUTPATIENT
Start: 2022-12-21 | End: 2022-12-22 | Stop reason: HOSPADM

## 2022-12-21 RX ORDER — SODIUM CHLORIDE 9 MG/ML
INJECTION, SOLUTION INTRAVENOUS PRN
Status: DISCONTINUED | OUTPATIENT
Start: 2022-12-21 | End: 2022-12-22 | Stop reason: HOSPADM

## 2022-12-21 RX ORDER — ACETAMINOPHEN 650 MG/1
650 SUPPOSITORY RECTAL EVERY 6 HOURS PRN
Status: DISCONTINUED | OUTPATIENT
Start: 2022-12-21 | End: 2022-12-22 | Stop reason: HOSPADM

## 2022-12-21 RX ORDER — ALBUTEROL SULFATE 90 UG/1
2 AEROSOL, METERED RESPIRATORY (INHALATION) EVERY 6 HOURS PRN
Status: DISCONTINUED | OUTPATIENT
Start: 2022-12-21 | End: 2022-12-21 | Stop reason: CLARIF

## 2022-12-21 RX ORDER — POTASSIUM CHLORIDE 20 MEQ/1
40 TABLET, EXTENDED RELEASE ORAL PRN
Status: DISCONTINUED | OUTPATIENT
Start: 2022-12-21 | End: 2022-12-22 | Stop reason: HOSPADM

## 2022-12-21 RX ORDER — ACETAMINOPHEN 325 MG/1
650 TABLET ORAL EVERY 6 HOURS PRN
Status: DISCONTINUED | OUTPATIENT
Start: 2022-12-21 | End: 2022-12-22 | Stop reason: HOSPADM

## 2022-12-21 RX ORDER — SODIUM CHLORIDE 0.9 % (FLUSH) 0.9 %
5-40 SYRINGE (ML) INJECTION EVERY 12 HOURS SCHEDULED
Status: DISCONTINUED | OUTPATIENT
Start: 2022-12-21 | End: 2022-12-22 | Stop reason: HOSPADM

## 2022-12-21 RX ORDER — FLUOXETINE 10 MG/1
10 TABLET, FILM COATED ORAL DAILY
Status: DISCONTINUED | OUTPATIENT
Start: 2022-12-21 | End: 2022-12-22 | Stop reason: HOSPADM

## 2022-12-21 RX ADMIN — DEXAMETHASONE 40 MG: 4 TABLET ORAL at 18:17

## 2022-12-21 RX ADMIN — SODIUM CHLORIDE: 9 INJECTION, SOLUTION INTRAVENOUS at 01:39

## 2022-12-21 RX ADMIN — IMMUNE GLOBULIN (HUMAN) 80 G: 10 INJECTION INTRAVENOUS; SUBCUTANEOUS at 17:58

## 2022-12-21 RX ADMIN — Medication 5 MG: at 23:29

## 2022-12-21 NOTE — ED NOTES
Called [facility] to arrange transport, Spoke w/ PRUDENCIO Coffey [phone], RN  12/20/22 0033

## 2022-12-21 NOTE — PROGRESS NOTES
Radha Samaniego was ordered Lisdexamfetamine (Vyvanse) which is a nonformulary medication. This medication will need to be supplied by the patient as the pharmacy does not carry this non-formulary medication. If the medication has not been administered by 1400 on the following day from the time the order was placed, a pharmacist will follow-up with the nurse of the patient to assess the capability of the patient to bring in the medication. If it is determined that the patient cannot supply the medication and it is not available to be dispensed from the pharmacy, the provider will be notified.

## 2022-12-21 NOTE — CARE COORDINATION
Care Coordination  The patient went to see his primary care physician yesterday in follow up to not feeling well with generalized fatigue for the past year. He was sent to the emergency room for abnormal labs. He was exposed to covid in the last few weeks and has noticed increase in bruising  on his extremities and trunk. He was sick with an upper respiratory infection last week. He also was exposed to his girlfriend who had tested ++ for mono in September 22. Labs ++ mono test, wbc 10.0, platlets low at 15 and his fibrinogen bloodwork is low. Ct of the abdomen and chest is negative. Hem onc on consult- in September of this year his platlets where normal at 36. Oncology Dr Rishabh Nolen is also consulted. Per the patient he lives with his mom in a split level home 2-3 steps to enter. He was independent prior to admission. His primary care physician is Dr Deena Ramos and his pharmacy is [facility] his plan is to return home once medically stable. His ride home is his mother.

## 2022-12-21 NOTE — PROGRESS NOTES
Called Ailin Never Learn NP to request something to help sleep, awaiting new orders     Brett Longoria RN

## 2022-12-21 NOTE — CONSULTS
[facility]  Dr. Naresh Umanzor      Patient Name: Sharyle Packer  YOB: 2000  PCP: Joe Gonzales DO   Referring Provider:      Reason for Consultation:   Chief Complaint   Patient presents with    Other     Patient sent in for low platelets. History of Present Illness: This pt is a 24 yo male who presented to ED with abnormal labs, so sent to ER (thrombocytopenia). Complains of generalized fatigue over the last year after covid 19. More recently, ~1 month, he has had increased ecchymosis, and had a URI ~1 week ago, Girlfriend has mono in 9/22. No overt bleeding. Patients mono spot test positive. Smear with severe thrombocytopenia and mild neutrophilia and reactive lymphocytosis. Platelets only 15. CT A/P with no acute process, no HSM. Hematology consulted for further evaluation    Diagnostic Data:     Past Medical History:   Diagnosis Date    Asthma        Patient Active Problem List    Diagnosis Date Noted    Thrombocytopenia ([facility] [address] 12/21/2022    Appendicitis 06/01/2022        Past Surgical History:   Procedure Laterality Date    LAPAROSCOPIC APPENDECTOMY N/A 6/1/2022    APPENDECTOMY LAPAROSCOPIC performed by Tavo Welch MD at [facility] OR       Family History  Family History   Problem Relation Age of Onset    No Known Problems Mother     No Known Problems Father        Social History    TOBACCO:   reports that he has quit smoking. He has never used smokeless tobacco.  ETOH:   reports current alcohol use. Home Medications  Prior to Admission medications    Medication Sig Start Date End Date Taking?  Authorizing Provider   omeprazole (PRILOSEC) 20 MG delayed release capsule Take 1 capsule by mouth every morning (before breakfast)  Patient not taking: No sig reported 9/3/22   Alton Downs MD   ondansetron (ZOFRAN ODT) 4 MG disintegrating tablet Place 2 tablets under the tongue every 8 hours as needed for Nausea or Vomiting  Patient not taking: Reported on 12/20/2022 9/3/22   Rosy Acosta MD   FLUoxetine (PROZAC) 10 MG capsule TAKE ONE CAPSULE BY MOUTH DAILY FOR 30 DAYS 1/18/21   Historical Provider, MD   ibuprofen (ADVIL;MOTRIN) 800 MG tablet Take 1 tablet by mouth every 8 hours as needed for Pain 1/18/20 6/14/22  Rosy Acosta MD   albuterol sulfate HFA (PROAIR HFA) 108 (90 Base) MCG/ACT inhaler Inhale 2 puffs into the lungs every 6 hours as needed for Wheezing 1/18/20   Rosy Acosta MD   Lisdexamfetamine Dimesylate (VYVANSE) 20 MG CAPS Take 20 mg by mouth daily    Historical Provider, MD       Allergies  No Known Allergies    Review of Systems:    As in HPI, otherwise negative      Objective  /72   Pulse 75   Temp 97.3 °F (36.3 °C)   Resp 17   SpO2 98%     Physical Exam:   Performance Status:  General: AAO to person, place, time, in no acute distress, pleasant   Head and neck : PERRLA, EOMI . Sclera non icteric  Oropharynx : Clear  Neck: no JVD,  no adenopathy  LYMPHATICS : No LAD  Heart: Regular rate and regular rhythm, no murmur  Lungs: Clear to auscultation   Extremities: No edema,no cyanosis, no clubbing  Abdomen: Soft, non-tender;no masses, no organomegaly  Skin:  ecchymosis diffusely   Neurologic:Cranial nerves grossly intact.  No focal motor deficits     Recent Laboratory Data-   Lab Results   Component Value Date    WBC 7.0 12/21/2022    HGB 15.0 12/21/2022    HCT 44.1 12/21/2022    MCV 84.2 12/21/2022    PLT 14 (LL) 12/21/2022    LYMPHOPCT 27.7 12/21/2022    RBC 5.24 12/21/2022    MCH 28.6 12/21/2022    MCHC 34.0 12/21/2022    RDW 12.2 12/21/2022    NEUTOPHILPCT 62.6 12/21/2022    MONOPCT 7.6 12/21/2022    BASOPCT 0.4 12/21/2022    NEUTROABS 4.37 12/21/2022    LYMPHSABS 1.93 12/21/2022    MONOSABS 0.53 12/21/2022    EOSABS 0.10 12/21/2022    BASOSABS 0.03 12/21/2022       Lab Results   Component Value Date     12/21/2022    K 4.1 12/21/2022     12/21/2022    CO2 27 12/21/2022    BUN 12 12/21/2022    CREATININE 1.1 12/21/2022    GLUCOSE 80 12/21/2022    CALCIUM 9.9 12/21/2022    PROT 7.3 12/20/2022    LABALBU 4.8 12/20/2022    BILITOT 0.5 12/20/2022    ALKPHOS 64 12/20/2022    AST 24 12/20/2022    ALT 26 12/20/2022    LABGLOM >60 12/21/2022    GFRAA >60 09/03/2022       No results found for: IRON, TIBC, FERRITIN        Radiology-    CT ABDOMEN PELVIS W IV CONTRAST Additional Contrast? None   Final Result   No acute process in the abdomen and pelvis. XR CHEST PORTABLE   Final Result   No acute process. ASSESSMENT/PLAN :  24 yo male   Mononucleosis   Thrombocytopenia    - Severe thrombocytopenia, likely related to mono infection  - CBC with platelets 14  - Typically, thrombocytopenia associated with EBV infection resolves spontaneously, though the time frame for this is variable and usually takes between 10-14 days  - Given the severity of the thrombocytopenia, will treat similarly to ITP with IVIG (1g/kg x 2 days) and steroids (dex 40 mg daily x 4)  - Trend platelet count  - Transfuse platelets only if bleeding develops  - Will follow    Thank you for this consult.  Please call with further questions or concerns      Electronically signed by Roland Vasquez MD on 12/21/2022 at 4:25 PM

## 2022-12-21 NOTE — H&P
[facility]  History and Physical      CHIEF COMPLAINT:    Chief Complaint   Patient presents with    Other     Patient sent in for low platelets. Patient of Zenon Cormier  presents with: Thrombocytopenia ([address]    History of Present Illness:   Patient is a 59-year-old male with a history of asthma. Patient presented to the ED with complaints of abnormal labs -  low platelets. Patient reports he has been feeling generalized fatigue for approximately 1 year possibly related to after being exposed to COVID-19. Over the past 2 or 3 weeks he has noticed increasing bruising. Patient was sick with a URI approximately 1 week ago as patient went to his PCP had blood work drawn and was found to have a low platelets-22,000. His PCP recommended he come to the emergency room. Patient states he has had several bruises across his extremities and trunk in various stages of healing. Patient also reports his girlfriend tested positive for mono in September. Patient did receive an IM injection of Decadron approximately 5 days ago at his PCPs. Patient is alert and oriented on examination. Patient denies any chest pain, shortness of breath, fever, chills, headache, dizziness, blurred vision, and abdominal pain. ER work-up revealed positive mono test and platelet count of 15. Decision was made to admit patient to telemetry unit for further testing and treatment. mother at bedside denies any childhood illnesses or diseases no unusual history of bleeding. There is no complaints of gingival bleeding while brushing teeth, hemoptysis, epistaxis, easy bruising over the past 1 month or so has been apparent. No new medications initiated recently. No family history of blood clotting disorders or platelet disorders. He has had COVID x3 over the past 2 years and remains unvaccinated. REVIEW OF SYSTEMS:  Pertinent negatives are above in HPI. 10 point ROS otherwise negative.       Past Medical History:   Diagnosis Date    Asthma          Past Surgical History:   Procedure Laterality Date    LAPAROSCOPIC APPENDECTOMY N/A 6/1/2022    APPENDECTOMY LAPAROSCOPIC performed by Alvin Diamond MD at [facility] OR       Medications Prior to Admission:    Medications Prior to Admission: omeprazole (PRILOSEC) 20 MG delayed release capsule, Take 1 capsule by mouth every morning (before breakfast) (Patient not taking: No sig reported)  ondansetron (ZOFRAN ODT) 4 MG disintegrating tablet, Place 2 tablets under the tongue every 8 hours as needed for Nausea or Vomiting (Patient not taking: Reported on 12/20/2022)  FLUoxetine (PROZAC) 10 MG capsule, TAKE ONE CAPSULE BY MOUTH DAILY FOR 30 DAYS  ibuprofen (ADVIL;MOTRIN) 800 MG tablet, Take 1 tablet by mouth every 8 hours as needed for Pain  albuterol sulfate HFA (PROAIR HFA) 108 (90 Base) MCG/ACT inhaler, Inhale 2 puffs into the lungs every 6 hours as needed for Wheezing  Lisdexamfetamine Dimesylate (VYVANSE) 20 MG CAPS, Take 20 mg by mouth daily    Note that the patient's home medications were reviewed and the above list is accurate to the best of my knowledge at the time of the exam.    Allergies:    Patient has no known allergies. Social History:    reports that he has quit smoking. He has never used smokeless tobacco. He reports current alcohol use. He reports that he does not use drugs. Family History:   family history includes No Known Problems in his father and mother. PHYSICAL EXAM:    Vitals:  /79   Pulse 75   Temp 97.5 °F (36.4 °C) (Oral)   Resp 16   SpO2 98%       General appearance: NAD, conversant  Eyes: Sclerae anicteric, PERRLA  HEENT: AT/NC, MMM  Neck: FROM, supple, no thyromegaly  Lymph: No cervical / supraclavicular lymphadenopathy  Lungs: Clear to auscultation, WOB normal  CV: RRR, no MRGs, no lower extremity edema  Abdomen: Soft, non-tender; no masses or HSM, +BS  Extremities: FROM without synovitis.   No clubbing or cyanosis of the hands.  Skin: no rash, induration, lesions, or ulcers-several areas of ecchymoses on body currently which apparently is an improvement from about a week ago  Psych: Calm and cooperative. Normal judgement and insight. Normal mood and affect. Neuro: Alert and interactive, face symmetric, speech fluent. LABS:  All labs reviewed. Of note:  CBC with Differential:    Lab Results   Component Value Date/Time    WBC 10.0 12/20/2022 04:07 PM    RBC 5.27 12/20/2022 04:07 PM    HGB 14.7 12/20/2022 04:07 PM    HCT 42.9 12/20/2022 04:07 PM    PLT 15 12/20/2022 04:07 PM    MCV 81.4 12/20/2022 04:07 PM    MCH 27.9 12/20/2022 04:07 PM    MCHC 34.3 12/20/2022 04:07 PM    RDW 12.5 12/20/2022 04:07 PM    LYMPHOPCT 16.5 12/20/2022 04:07 PM    MONOPCT 7.8 12/20/2022 04:07 PM    BASOPCT 0.3 12/20/2022 04:07 PM    MONOSABS 0.78 12/20/2022 04:07 PM    LYMPHSABS 1.65 12/20/2022 04:07 PM    EOSABS 0.03 12/20/2022 04:07 PM    BASOSABS 0.03 12/20/2022 04:07 PM     CMP:    Lab Results   Component Value Date/Time     12/20/2022 04:07 PM    K 3.5 12/20/2022 04:07 PM    K 4.0 05/31/2022 08:42 PM     12/20/2022 04:07 PM    CO2 27 12/20/2022 04:07 PM    BUN 14 12/20/2022 04:07 PM    CREATININE 1.1 12/20/2022 04:07 PM    GFRAA >60 09/03/2022 01:34 AM    LABGLOM >60 12/20/2022 04:07 PM    GLUCOSE 95 12/20/2022 04:07 PM    PROT 7.3 12/20/2022 04:07 PM    LABALBU 4.8 12/20/2022 04:07 PM    CALCIUM 9.8 12/20/2022 04:07 PM    BILITOT 0.5 12/20/2022 04:07 PM    ALKPHOS 64 12/20/2022 04:07 PM    AST 24 12/20/2022 04:07 PM    ALT 26 12/20/2022 04:07 PM       Imaging:  Cxr:  WNL    CT Abd/Pelvis:  No acute process in the abdomen and pelvis    EKG:  I've personally reviewed the patient's EKG:      Telemetry:  I've personally reviewed the patient's telemetry:  NSR    ASSESSMENT/PLAN:  Principal Problem: Thrombocytopenia ([address]  Resolved Problems:    * No resolved hospital problems.  *    79-year-old male with a history of asthma has been fatigued for approximately 1 year and has had multiple bruising and presented to the ED due to critical labs and is admitted to telemetry unit with    Thrombocytopenia  Patient tested positive for mono-supportive care  CT abdomen pelvis-no splenomegaly  Monitor labs-platelets 14  Will transfuse platelets at the discretion of oncology  Consult oncology    Medication for other comorbidities continue as appropriate dose adjustment as necessary. DVT prophylaxis-PCD's  PT OT  Discharge planning  Case discussed with attending and agreed upon plan of care. Code status: Full  Requires inpatient level of care  HERNESTO Bedolla CNP    8:03 AM  12/21/2022     Above note edited to reflect my thoughts   Case discussed at length with mother and patient  EBV recently and several infections with SARS-CoV-2 subacutely, likely the culprit in this case of profound thrombocytopenia  Given marked thrombocytopenia of 14,000, agree with IVIG and more aggressive care with IV steroids-no platelet transfusion unless bleeding develops-appreciate hematology input  Patient does give history of approximately to 15 pound weight loss over past 1 which has been unintentional  Peripheral smear -severe thrombocytopenia  No evidence of splenomegaly is noted on CT abdomen pelvis  No risk factors for HIV, however we will check HIV as well    I personally saw, examined and provided care for the patient. Radiographs, labs and medication list were reviewed by me independently. The case was discussed in detail and plans for care were established. Review of Sara BENSON CNP, documentation was conducted and revisions were made as appropriate directly by me. I agree with the above documented exam, problem list, and plan of care.      Raymundo Metz MD  7:43 PM  12/21/2022

## 2022-12-22 VITALS
OXYGEN SATURATION: 97 % | TEMPERATURE: 98.8 F | RESPIRATION RATE: 18 BRPM | DIASTOLIC BLOOD PRESSURE: 67 MMHG | HEART RATE: 62 BPM | SYSTOLIC BLOOD PRESSURE: 115 MMHG

## 2022-12-22 LAB
BASOPHILS ABSOLUTE: 0 E9/L (ref 0–0.2)
BASOPHILS ABSOLUTE: 0 E9/L (ref 0–0.2)
BASOPHILS RELATIVE PERCENT: 0 % (ref 0–2)
BASOPHILS RELATIVE PERCENT: 0 % (ref 0–2)
EOSINOPHILS ABSOLUTE: 0 E9/L (ref 0.05–0.5)
EOSINOPHILS ABSOLUTE: 0 E9/L (ref 0.05–0.5)
EOSINOPHILS RELATIVE PERCENT: 0 % (ref 0–6)
EOSINOPHILS RELATIVE PERCENT: 0 % (ref 0–6)
EPSTEIN-BARR VCA IGG: 227 U/ML (ref 0–21.9)
HCT VFR BLD CALC: 37.6 % (ref 37–54)
HCT VFR BLD CALC: 41.6 % (ref 37–54)
HEMOGLOBIN: 13.2 G/DL (ref 12.5–16.5)
HEMOGLOBIN: 14.6 G/DL (ref 12.5–16.5)
LYMPHOCYTES ABSOLUTE: 0.68 E9/L (ref 1.5–4)
LYMPHOCYTES ABSOLUTE: 0.75 E9/L (ref 1.5–4)
LYMPHOCYTES RELATIVE PERCENT: 7.8 % (ref 20–42)
LYMPHOCYTES RELATIVE PERCENT: 8.7 % (ref 20–42)
MCH RBC QN AUTO: 28 PG (ref 26–35)
MCH RBC QN AUTO: 28.1 PG (ref 26–35)
MCHC RBC AUTO-ENTMCNC: 35.1 % (ref 32–34.5)
MCHC RBC AUTO-ENTMCNC: 35.1 % (ref 32–34.5)
MCV RBC AUTO: 79.7 FL (ref 80–99.9)
MCV RBC AUTO: 80.2 FL (ref 80–99.9)
MONOCYTES ABSOLUTE: 0.09 E9/L (ref 0.1–0.95)
MONOCYTES ABSOLUTE: 0.23 E9/L (ref 0.1–0.95)
MONOCYTES RELATIVE PERCENT: 0.9 % (ref 2–12)
MONOCYTES RELATIVE PERCENT: 2.6 % (ref 2–12)
NEUTROPHILS ABSOLUTE: 6.76 E9/L (ref 1.8–7.3)
NEUTROPHILS ABSOLUTE: 8.55 E9/L (ref 1.8–7.3)
NEUTROPHILS RELATIVE PERCENT: 88.7 % (ref 43–80)
NEUTROPHILS RELATIVE PERCENT: 91.3 % (ref 43–80)
PDW BLD-RTO: 12.3 FL (ref 11.5–15)
PDW BLD-RTO: 12.3 FL (ref 11.5–15)
PLATELET # BLD: 53 E9/L (ref 130–450)
PLATELET # BLD: 75 E9/L (ref 130–450)
PLATELET CONFIRMATION: NORMAL
PLATELET CONFIRMATION: NORMAL
PMV BLD AUTO: 11.7 FL (ref 7–12)
PMV BLD AUTO: 11.9 FL (ref 7–12)
RBC # BLD: 4.69 E12/L (ref 3.8–5.8)
RBC # BLD: 5.22 E12/L (ref 3.8–5.8)
RBC # BLD: NORMAL 10*6/UL
RBC # BLD: NORMAL 10*6/UL
WBC # BLD: 7.6 E9/L (ref 4.5–11.5)
WBC # BLD: 9.4 E9/L (ref 4.5–11.5)

## 2022-12-22 PROCEDURE — 85025 COMPLETE CBC W/AUTO DIFF WBC: CPT

## 2022-12-22 PROCEDURE — 6370000000 HC RX 637 (ALT 250 FOR IP): Performed by: FAMILY MEDICINE

## 2022-12-22 PROCEDURE — 6360000002 HC RX W HCPCS: Performed by: INTERNAL MEDICINE

## 2022-12-22 PROCEDURE — 36415 COLL VENOUS BLD VENIPUNCTURE: CPT

## 2022-12-22 PROCEDURE — 2580000003 HC RX 258: Performed by: FAMILY MEDICINE

## 2022-12-22 RX ADMIN — DEXAMETHASONE 40 MG: 4 TABLET ORAL at 10:16

## 2022-12-22 RX ADMIN — Medication 10 ML: at 00:58

## 2022-12-22 RX ADMIN — Medication 10 ML: at 08:48

## 2022-12-22 RX ADMIN — FLUOXETINE HYDROCHLORIDE 10 MG: 10 TABLET ORAL at 08:48

## 2022-12-22 RX ADMIN — IMMUNE GLOBULIN (HUMAN) 80 G: 10 INJECTION INTRAVENOUS; SUBCUTANEOUS at 09:00

## 2022-12-22 NOTE — CARE COORDINATION
Care Coordination  The patient was admitted due to generalized weakness and fatique. He was found to have a ++ mono test.  His platlets this am are 54 and they where 15 on admit. Hem oncology saw the patient . Thrombocytopenia associated with EBV. The patient was started on IV Immunoglobin, 10 gm iv x 1 and po decadron 40 mg po qd. His plan is to return home upon discharge as he lives with his mom. She is his ride home.  Will follow

## 2022-12-22 NOTE — PROGRESS NOTES
[facility]  Dr. Elvis Wells      Patient Name: Chinyere Quach  YOB: 2000  PCP: Noelle Ram DO   Referring Provider:      Reason for Consultation:   Chief Complaint   Patient presents with    Other     Patient sent in for low platelets. Subjective:  Feels well, no bleeding    History of Present Illness: This pt is a 24 yo male who presented to ED with abnormal labs, so sent to ER (thrombocytopenia). Complains of generalized fatigue over the last year after covid 19. More recently, ~1 month, he has had increased ecchymosis, and had a URI ~1 week ago, Girlfriend has mono in 9/22. No overt bleeding. Patients mono spot test positive. Smear with severe thrombocytopenia and mild neutrophilia and reactive lymphocytosis. Platelets only 15. CT A/P with no acute process, no HSM. Hematology consulted for further evaluation    Diagnostic Data:     Past Medical History:   Diagnosis Date    Asthma        Patient Active Problem List    Diagnosis Date Noted    [address] [address] 12/21/2022    Appendicitis 06/01/2022        Past Surgical History:   Procedure Laterality Date    LAPAROSCOPIC APPENDECTOMY N/A 6/1/2022    APPENDECTOMY LAPAROSCOPIC performed by Brian Hassan MD at [facility] OR       Family History  Family History   Problem Relation Age of Onset    No Known Problems Mother     No Known Problems Father        Social History    TOBACCO:   reports that he has quit smoking. He has never used smokeless tobacco.  ETOH:   reports current alcohol use. Home Medications  Prior to Admission medications    Medication Sig Start Date End Date Taking?  Authorizing Provider   omeprazole (PRILOSEC) 20 MG delayed release capsule Take 1 capsule by mouth every morning (before breakfast)  Patient not taking: No sig reported 9/3/22   Tommy Encinas MD   ondansetron (ZOFRAN ODT) 4 MG disintegrating tablet Place 2 tablets under the tongue every 8 hours as needed for Nausea or Vomiting  Patient not taking: Reported on 12/20/2022 9/3/22   Braydon Garcia MD   FLUoxetine (PROZAC) 10 MG capsule TAKE ONE CAPSULE BY MOUTH DAILY FOR 30 DAYS 1/18/21   Historical Provider, MD   ibuprofen (ADVIL;MOTRIN) 800 MG tablet Take 1 tablet by mouth every 8 hours as needed for Pain 1/18/20 6/14/22  Braydon Garcia MD   albuterol sulfate HFA (PROAIR HFA) 108 (90 Base) MCG/ACT inhaler Inhale 2 puffs into the lungs every 6 hours as needed for Wheezing 1/18/20   Braydon Garcia MD   Lisdexamfetamine Dimesylate (VYVANSE) 20 MG CAPS Take 20 mg by mouth daily    Historical Provider, MD       Allergies  No Known Allergies    Review of Systems:    As in HPI, otherwise negative      Objective  /66   Pulse 74   Temp 96.9 °F (36.1 °C) (Temporal)   Resp 18   SpO2 99%     Physical Exam:   Performance Status:  General: AAO to person, place, time, in no acute distress, pleasant   Head and neck : PERRLA, EOMI . Sclera non icteric  Oropharynx : Clear  Neck: no JVD,  no adenopathy  LYMPHATICS : No LAD  Heart: Regular rate and regular rhythm, no murmur  Lungs: Clear to auscultation   Extremities: No edema,no cyanosis, no clubbing  Abdomen: Soft, non-tender;no masses, no organomegaly  Skin:  ecchymosis diffusely   Neurologic:Cranial nerves grossly intact.  No focal motor deficits     Recent Laboratory Data-   Lab Results   Component Value Date    WBC 9.4 12/22/2022    HGB 14.6 12/22/2022    HCT 41.6 12/22/2022    MCV 79.7 (L) 12/22/2022    PLT 53 (L) 12/22/2022    LYMPHOPCT 7.8 (L) 12/22/2022    RBC 5.22 12/22/2022    MCH 28.0 12/22/2022    MCHC 35.1 (H) 12/22/2022    RDW 12.3 12/22/2022    NEUTOPHILPCT 91.3 (H) 12/22/2022    MONOPCT 0.9 (L) 12/22/2022    BASOPCT 0.0 12/22/2022    NEUTROABS 8.55 (H) 12/22/2022    LYMPHSABS 0.75 (L) 12/22/2022    MONOSABS 0.09 (L) 12/22/2022    EOSABS 0.00 (L) 12/22/2022    BASOSABS 0.00 12/22/2022       Lab Results   Component Value Date     12/21/2022    K 4.1 12/21/2022  12/21/2022    CO2 27 12/21/2022    BUN 12 12/21/2022    CREATININE 1.1 12/21/2022    GLUCOSE 80 12/21/2022    CALCIUM 9.9 12/21/2022    PROT 7.3 12/20/2022    LABALBU 4.8 12/20/2022    BILITOT 0.5 12/20/2022    ALKPHOS 64 12/20/2022    AST 24 12/20/2022    ALT 26 12/20/2022    LABGLOM >60 12/21/2022    GFRAA >60 09/03/2022       No results found for: IRON, TIBC, FERRITIN        Radiology-    CT ABDOMEN PELVIS W IV CONTRAST Additional Contrast? None   Final Result   No acute process in the abdomen and pelvis. XR CHEST PORTABLE   Final Result   No acute process. ASSESSMENT/PLAN :  24 yo male   Mononucleosis   Thrombocytopenia    - Severe thrombocytopenia, likely related to mono infection  - CBC with platelets 14  - Typically, thrombocytopenia associated with EBV infection resolves spontaneously, though the time frame for this is variable and usually takes between 10-14 days  - Given the severity of the thrombocytopenia, will treat similarly to ITP with IVIG (1g/kg x 2 days) and steroids (dex 40 mg daily x 4)  - Trend platelet count  - Transfuse platelets only if bleeding develops  - Will follow    12/22/22  - CBC with platelets up to 53  - Continue pulse dose dex as above for total of 4 days  - No bleeding  - Can be Dc'd today after he gets dose 2 of IVIG  - To follow with PCP as outpatient.  If thrombocytopenia returns happy to see him in consultation      Electronically signed by Mirtha Chavez MD on 12/22/2022 at 2:35 PM

## 2022-12-23 LAB
Lab: NORMAL
REPORT: NORMAL
THIS TEST SENT TO: NORMAL

## 2022-12-29 NOTE — DISCHARGE SUMMARY
[facility]   Discharge summary   Patient ID:  Leslye Mir  [id number]  43 y.o.  2000    Admit date: 12/20/2022    Discharge date and time: 12/22/2022    Admission Diagnoses:   Patient Active Problem List   Diagnosis    Appendicitis    Thrombocytopenia [facility])       Discharge Diagnoses: Thrombocytopenia    Consults: hematology/oncology    Procedures: None    Hospital Course: The patient is a 25 y.o. male of [facility], DO     71-year-old male with a history of asthma has been fatigued for approximately 1 year and has had multiple bruising and presented to the ED due to critical labs and is admitted to telemetry unit with     Thrombocytopenia  Patient tested positive for mono-supportive care  CT abdomen pelvis-no splenomegaly  Monitor labs-platelets 14  Will transfuse platelets at the discretion of oncology  Consult oncology  Case discussed at length with mother and patient  EBV recently and several infections with SARS-CoV-2 subacutely, likely the culprit in this case of profound thrombocytopenia  Given marked thrombocytopenia of 14,000, agree with IVIG and more aggressive care with IV steroids-no platelet transfusion unless bleeding develops-appreciate hematology input  Patient does give history of approximately to 15 pound weight loss over past 1 which has been unintentional  Peripheral smear -severe thrombocytopenia  No evidence of splenomegaly is noted on CT abdomen pelvis  No risk factors for HIV, however we will check HIV as well  HIV panel negative    Patient was given 2 doses of IVIG by heme-onc. Patient is discharged home in stable condition with medications listed below. Patient is to follow-up with heme-onc within 2 weeks of discharge. No results for input(s): WBC, HGB, HCT, PLT in the last 72 hours. No results for input(s): NA, K, CL, CO2, BUN, CREATININE, GLU, CALCIUM in the last 72 hours.     CT ABDOMEN PELVIS W IV CONTRAST Additional Contrast? None    Result Date: 12/20/2022  EXAMINATION: CT OF THE ABDOMEN AND PELVIS WITH CONTRAST 12/20/2022 5:44 pm TECHNIQUE: CT of the abdomen and pelvis was performed with the administration of intravenous contrast. Multiplanar reformatted images are provided for review. Automated exposure control, iterative reconstruction, and/or weight based adjustment of the mA/kV was utilized to reduce the radiation dose to as low as reasonably achievable. COMPARISON: September 3, 2022 HISTORY: ORDERING SYSTEM PROVIDED HISTORY: thrombocytopenia, Mono TECHNOLOGIST PROVIDED HISTORY: Reason for exam:->thrombocytopenia, Mono Additional Contrast?->None Decision Support Exception - unselect if not a suspected or confirmed emergency medical condition->Emergency Medical Condition (MA) FINDINGS: Lower Chest: No infiltrates or pleural effusion. Organs: No focal liver lesions. Gallbladder is present with no calcified stones. Spleen is not enlarged. Pancreas and adrenal glands appear unremarkable. There is symmetric enhancement of the kidneys with no hydronephrosis or perinephric infiltration. GI/Bowel: There are no obstructing or constricting lesions. The appendix is surgically absent. Pelvis: Bladder is suboptimally distended with pseudo thickening of the wall. There is no significant free fluid. Peritoneum/Retroperitoneum: No free air or significant adenopathy. Bones/Soft Tissues: Unremarkable. No acute process in the abdomen and pelvis. XR CHEST PORTABLE    Result Date: 12/20/2022  EXAMINATION: ONE XRAY VIEW OF THE CHEST 12/20/2022 5:13 pm COMPARISON: None. HISTORY: ORDERING SYSTEM PROVIDED HISTORY: thrombocytopenia, Mono TECHNOLOGIST PROVIDED HISTORY: Reason for exam:->thrombocytopenia, Mono FINDINGS: The lungs are without acute focal process. There is no effusion or pneumothorax. The cardiomediastinal silhouette is without acute process. The osseous structures are without acute process. No acute process.        Discharge Exam:    HEENT: NCAT, PERRLA, No JVD  Heart:  RRR, no murmurs, gallops, or rubs. Lungs:  CTA bilaterally, no wheeze, rales or rhonchi  Abd: bowel sounds present, nontender, nondistended, no masses  Extrem:  No clubbing, cyanosis, or edema    Disposition: home     Patient Condition at Discharge: Stable    Patient Instructions:      Medication List        CONTINUE taking these medications      albuterol sulfate  (90 Base) MCG/ACT inhaler  Commonly known as: ProAir HFA  Inhale 2 puffs into the lungs every 6 hours as needed for Wheezing     FLUoxetine 10 MG capsule  Commonly known as: PROZAC     ibuprofen 800 MG tablet  Commonly known as: ADVIL;MOTRIN  Take 1 tablet by mouth every 8 hours as needed for Pain     Lisdexamfetamine Dimesylate 20 MG Caps  Commonly known as: VYVANSE            ASK your doctor about these medications      Dexamethasone 20 MG Tabs  Take 40 mg by mouth daily for 2 doses  Ask about: Should I take this medication?     omeprazole 20 MG delayed release capsule  Commonly known as: PRILOSEC  Take 1 capsule by mouth every morning (before breakfast)     ondansetron 4 MG disintegrating tablet  Commonly known as: Zofran ODT  Place 2 tablets under the tongue every 8 hours as needed for Nausea or Vomiting               Where to Get Your Medications        These medications were sent to [address] [phone]  [place]. [address] [facility] [place]      Phone: [phone]   Dexamethasone 20 MG Tabs       Activity: activity as tolerated  Diet: regular diet    Pt has been advised to: Follow-up with Ayden Davis DO in 1 week.   Follow-up with consultants as recommended by them    Note that over 30 minutes was spent in preparing discharge papers, discussing discharge with patient, medication review, etc.    Signed:  Radha Hoffman MD  12/22/2022

## 2023-05-06 ENCOUNTER — HOSPITAL ENCOUNTER (EMERGENCY)
Age: 23
Discharge: HOME | End: 2023-05-07
Attending: STUDENT IN AN ORGANIZED HEALTH CARE EDUCATION/TRAINING PROGRAM
Payer: COMMERCIAL

## 2023-05-06 ENCOUNTER — APPOINTMENT (OUTPATIENT)
Dept: GENERAL RADIOLOGY | Age: 23
End: 2023-05-06
Payer: COMMERCIAL

## 2023-05-06 DIAGNOSIS — S60.221A CONTUSION OF RIGHT HAND, INITIAL ENCOUNTER: Primary | ICD-10-CM

## 2023-05-06 PROCEDURE — 99283 EMERGENCY DEPT VISIT LOW MDM: CPT

## 2023-05-06 PROCEDURE — 73130 X-RAY EXAM OF HAND: CPT

## 2023-05-06 RX ORDER — QUETIAPINE FUMARATE 50 MG/1
50 TABLET, FILM COATED ORAL NIGHTLY
COMMUNITY

## 2023-05-06 ASSESSMENT — PAIN - FUNCTIONAL ASSESSMENT: PAIN_FUNCTIONAL_ASSESSMENT: NONE - DENIES PAIN

## 2023-05-07 VITALS
SYSTOLIC BLOOD PRESSURE: 138 MMHG | HEIGHT: 69 IN | RESPIRATION RATE: 18 BRPM | BODY MASS INDEX: 23.7 KG/M2 | HEART RATE: 72 BPM | WEIGHT: 160 LBS | OXYGEN SATURATION: 95 % | TEMPERATURE: 98.3 F | DIASTOLIC BLOOD PRESSURE: 88 MMHG

## 2023-05-07 ASSESSMENT — ENCOUNTER SYMPTOMS
ABDOMINAL PAIN: 0
SINUS PRESSURE: 0
SHORTNESS OF BREATH: 0
EYE DISCHARGE: 0
SORE THROAT: 0
NAUSEA: 0
BACK PAIN: 0
WHEEZING: 0
VOMITING: 0
DIARRHEA: 0
EYE PAIN: 0
COUGH: 0
EYE REDNESS: 0

## 2024-11-22 ENCOUNTER — TRANSCRIBE ORDERS (OUTPATIENT)
Dept: ADMINISTRATIVE | Age: 24
End: 2024-11-22

## 2024-11-22 DIAGNOSIS — R06.02 SHORTNESS OF BREATH: Primary | ICD-10-CM

## 2025-08-23 ENCOUNTER — HOSPITAL ENCOUNTER (EMERGENCY)
Age: 25
Discharge: LEFT BEFORE DISCHARGE | End: 2025-08-24
Attending: EMERGENCY MEDICINE
Payer: COMMERCIAL

## 2025-08-23 DIAGNOSIS — R10.9 ABDOMINAL PAIN, UNSPECIFIED ABDOMINAL LOCATION: Primary | ICD-10-CM

## 2025-08-23 LAB
ALBUMIN SERPL-MCNC: 4.7 G/DL (ref 3.5–5.2)
ALP SERPL-CCNC: 91 U/L (ref 40–129)
ALT SERPL-CCNC: 119 U/L (ref 0–50)
ANION GAP SERPL CALCULATED.3IONS-SCNC: 16 MMOL/L (ref 7–16)
AST SERPL-CCNC: 59 U/L (ref 0–50)
BASOPHILS # BLD: 0.02 K/UL (ref 0–0.2)
BASOPHILS NFR BLD: 0 % (ref 0–2)
BILIRUB DIRECT SERPL-MCNC: 0.2 MG/DL (ref 0–0.2)
BILIRUB INDIRECT SERPL-MCNC: 0.3 MG/DL (ref 0–1)
BILIRUB SERPL-MCNC: 0.5 MG/DL (ref 0–1.2)
BILIRUB UR QL STRIP: NEGATIVE
BUN SERPL-MCNC: 15 MG/DL (ref 6–20)
CALCIUM SERPL-MCNC: 10 MG/DL (ref 8.6–10)
CHLORIDE SERPL-SCNC: 102 MMOL/L (ref 98–107)
CLARITY UR: CLEAR
CO2 SERPL-SCNC: 23 MMOL/L (ref 22–29)
COLOR UR: YELLOW
CREAT SERPL-MCNC: 1.1 MG/DL (ref 0.7–1.2)
EOSINOPHIL # BLD: 0.11 K/UL (ref 0.05–0.5)
EOSINOPHILS RELATIVE PERCENT: 2 % (ref 0–6)
ERYTHROCYTE [DISTWIDTH] IN BLOOD BY AUTOMATED COUNT: 13.1 % (ref 11.5–15)
GFR, ESTIMATED: >90 ML/MIN/1.73M2
GLUCOSE SERPL-MCNC: 92 MG/DL (ref 74–99)
GLUCOSE UR STRIP-MCNC: NEGATIVE MG/DL
HCT VFR BLD AUTO: 43.2 % (ref 37–54)
HGB BLD-MCNC: 14 G/DL (ref 12.5–16.5)
HGB UR QL STRIP.AUTO: NEGATIVE
IMM GRANULOCYTES # BLD AUTO: <0.03 K/UL (ref 0–0.58)
IMM GRANULOCYTES NFR BLD: 0 % (ref 0–5)
KETONES UR STRIP-MCNC: NEGATIVE MG/DL
LEUKOCYTE ESTERASE UR QL STRIP: NEGATIVE
LIPASE SERPL-CCNC: 27 U/L (ref 13–60)
LYMPHOCYTES NFR BLD: 2.37 K/UL (ref 1.5–4)
LYMPHOCYTES RELATIVE PERCENT: 31 % (ref 20–42)
MCH RBC QN AUTO: 28.3 PG (ref 26–35)
MCHC RBC AUTO-ENTMCNC: 32.4 G/DL (ref 32–34.5)
MCV RBC AUTO: 87.3 FL (ref 80–99.9)
MONOCYTES NFR BLD: 0.71 K/UL (ref 0.1–0.95)
MONOCYTES NFR BLD: 9 % (ref 2–12)
MUCOUS THREADS URNS QL MICRO: PRESENT
NEUTROPHILS NFR BLD: 57 % (ref 43–80)
NEUTS SEG NFR BLD: 4.35 K/UL (ref 1.8–7.3)
NITRITE UR QL STRIP: NEGATIVE
PH UR STRIP: 6 [PH] (ref 5–8)
PLATELET # BLD AUTO: 271 K/UL (ref 130–450)
PMV BLD AUTO: 10 FL (ref 7–12)
POTASSIUM SERPL-SCNC: 3.9 MMOL/L (ref 3.5–5.1)
PROT SERPL-MCNC: 7.6 G/DL (ref 6.4–8.3)
PROT UR STRIP-MCNC: NEGATIVE MG/DL
RBC # BLD AUTO: 4.95 M/UL (ref 3.8–5.8)
RBC #/AREA URNS HPF: ABNORMAL /HPF
SODIUM SERPL-SCNC: 140 MMOL/L (ref 136–145)
SP GR UR STRIP: >1.03 (ref 1–1.03)
UROBILINOGEN UR STRIP-ACNC: 0.2 EU/DL (ref 0–1)
WBC #/AREA URNS HPF: ABNORMAL /HPF
WBC OTHER # BLD: 7.6 K/UL (ref 4.5–11.5)

## 2025-08-23 PROCEDURE — 83690 ASSAY OF LIPASE: CPT

## 2025-08-23 PROCEDURE — 2580000003 HC RX 258

## 2025-08-23 PROCEDURE — 81001 URINALYSIS AUTO W/SCOPE: CPT

## 2025-08-23 PROCEDURE — 85025 COMPLETE CBC W/AUTO DIFF WBC: CPT

## 2025-08-23 PROCEDURE — 6360000002 HC RX W HCPCS

## 2025-08-23 PROCEDURE — 80053 COMPREHEN METABOLIC PANEL: CPT

## 2025-08-23 PROCEDURE — 82248 BILIRUBIN DIRECT: CPT

## 2025-08-23 PROCEDURE — 99285 EMERGENCY DEPT VISIT HI MDM: CPT

## 2025-08-23 PROCEDURE — 6370000000 HC RX 637 (ALT 250 FOR IP)

## 2025-08-23 PROCEDURE — 96374 THER/PROPH/DIAG INJ IV PUSH: CPT

## 2025-08-23 RX ORDER — ACETAMINOPHEN 325 MG/1
650 TABLET ORAL ONCE
Status: COMPLETED | OUTPATIENT
Start: 2025-08-23 | End: 2025-08-23

## 2025-08-23 RX ORDER — ONDANSETRON 2 MG/ML
4 INJECTION INTRAMUSCULAR; INTRAVENOUS ONCE
Status: COMPLETED | OUTPATIENT
Start: 2025-08-23 | End: 2025-08-23

## 2025-08-23 RX ORDER — 0.9 % SODIUM CHLORIDE 0.9 %
1000 INTRAVENOUS SOLUTION INTRAVENOUS ONCE
Status: COMPLETED | OUTPATIENT
Start: 2025-08-23 | End: 2025-08-24

## 2025-08-23 RX ADMIN — SODIUM CHLORIDE 1000 ML: 9 INJECTION, SOLUTION INTRAVENOUS at 23:06

## 2025-08-23 RX ADMIN — ACETAMINOPHEN 650 MG: 325 TABLET ORAL at 23:21

## 2025-08-23 RX ADMIN — ONDANSETRON 4 MG: 2 INJECTION, SOLUTION INTRAMUSCULAR; INTRAVENOUS at 23:21

## 2025-08-23 ASSESSMENT — LIFESTYLE VARIABLES
HOW OFTEN DO YOU HAVE A DRINK CONTAINING ALCOHOL: 2-4 TIMES A MONTH
HOW MANY STANDARD DRINKS CONTAINING ALCOHOL DO YOU HAVE ON A TYPICAL DAY: 1 OR 2

## 2025-08-23 ASSESSMENT — PAIN SCALES - GENERAL: PAINLEVEL_OUTOF10: 5

## 2025-08-23 ASSESSMENT — PAIN DESCRIPTION - ORIENTATION: ORIENTATION: RIGHT;UPPER

## 2025-08-23 ASSESSMENT — PAIN DESCRIPTION - LOCATION: LOCATION: ABDOMEN

## 2025-08-24 ENCOUNTER — APPOINTMENT (OUTPATIENT)
Dept: CT IMAGING | Age: 25
End: 2025-08-24
Payer: COMMERCIAL

## 2025-08-24 VITALS
RESPIRATION RATE: 18 BRPM | HEART RATE: 75 BPM | WEIGHT: 220 LBS | SYSTOLIC BLOOD PRESSURE: 132 MMHG | DIASTOLIC BLOOD PRESSURE: 90 MMHG | BODY MASS INDEX: 32.58 KG/M2 | OXYGEN SATURATION: 97 % | TEMPERATURE: 98.1 F | HEIGHT: 69 IN

## 2025-08-24 PROCEDURE — 74177 CT ABD & PELVIS W/CONTRAST: CPT

## 2025-08-24 PROCEDURE — 6360000002 HC RX W HCPCS: Performed by: EMERGENCY MEDICINE

## 2025-08-24 PROCEDURE — 6360000004 HC RX CONTRAST MEDICATION: Performed by: RADIOLOGY

## 2025-08-24 PROCEDURE — 96372 THER/PROPH/DIAG INJ SC/IM: CPT

## 2025-08-24 RX ORDER — IOPAMIDOL 755 MG/ML
75 INJECTION, SOLUTION INTRAVASCULAR
Status: COMPLETED | OUTPATIENT
Start: 2025-08-24 | End: 2025-08-24

## 2025-08-24 RX ADMIN — HYDROMORPHONE HYDROCHLORIDE 1 MG: 1 INJECTION, SOLUTION INTRAMUSCULAR; INTRAVENOUS; SUBCUTANEOUS at 02:38

## 2025-08-24 RX ADMIN — HYDROMORPHONE HYDROCHLORIDE 1 MG: 1 INJECTION, SOLUTION INTRAMUSCULAR; INTRAVENOUS; SUBCUTANEOUS at 05:44

## 2025-08-24 RX ADMIN — IOPAMIDOL 75 ML: 755 INJECTION, SOLUTION INTRAVENOUS at 00:40

## 2025-08-24 ASSESSMENT — PAIN DESCRIPTION - LOCATION
LOCATION: ABDOMEN
LOCATION: ABDOMEN

## 2025-08-24 ASSESSMENT — PAIN - FUNCTIONAL ASSESSMENT: PAIN_FUNCTIONAL_ASSESSMENT: 0-10

## 2025-08-24 ASSESSMENT — PAIN SCALES - GENERAL
PAINLEVEL_OUTOF10: 7
PAINLEVEL_OUTOF10: 5

## (undated) DEVICE — GLOVE SURG SZ 8 L12IN FNGR THK79MIL GRN LTX FREE

## (undated) DEVICE — INTENDED FOR TISSUE SEPARATION, AND OTHER PROCEDURES THAT REQUIRE A SHARP SURGICAL BLADE TO PUNCTURE OR CUT.: Brand: BARD-PARKER ® STAINLESS STEEL BLADES

## (undated) DEVICE — TROCAR: Brand: KII FIOS FIRST ENTRY

## (undated) DEVICE — KIT,ANTI FOG,W/SPONGE & FLUID,SOFT PACK: Brand: MEDLINE

## (undated) DEVICE — NEEDLE CLOSURE OMNICLOSE

## (undated) DEVICE — CUTTER ENDOSCP L340MM LIN ARTC SGL STROKE FIRING ENDOPATH

## (undated) DEVICE — NEEDLE HYPO 25GA L1.5IN BLU POLYPR HUB S STL REG BVL STR

## (undated) DEVICE — APPLICATOR MEDICATED 26 CC SOLUTION HI LT ORNG CHLORAPREP

## (undated) DEVICE — INSUFFLATION TUBING SET WITH FILTER, FUNNEL CONNECTOR AND LUER LOCK: Brand: JOSNOE MEDICAL INC

## (undated) DEVICE — SCISSORS ENDOSCP DIA5MM CRV MPLR CAUT W/ RATCH HNDL

## (undated) DEVICE — ENDOPATH REPROC BABCOCK

## (undated) DEVICE — ELECTRODE PT RET AD L9FT HI MOIST COND ADH HYDRGEL CORDED

## (undated) DEVICE — TROCAR: Brand: KII® SLEEVE

## (undated) DEVICE — TOWEL,OR,DSP,ST,BLUE,STD,6/PK,12PK/CS: Brand: MEDLINE

## (undated) DEVICE — ADHESIVE SKIN CLOSURE TOP 36 CC HI VISC DERMBND MINI

## (undated) DEVICE — GENERAL LAPAROSCOPY: Brand: MEDLINE INDUSTRIES, INC.

## (undated) DEVICE — INSUFFLATION NEEDLE TO ESTABLISH PNEUMOPERITONEUM.: Brand: INSUFFLATION NEEDLE

## (undated) DEVICE — GLOVE ORANGE PI 7 1/2   MSG9075

## (undated) DEVICE — SYRINGE 20ML LL S/C 50